# Patient Record
Sex: FEMALE | Race: OTHER | HISPANIC OR LATINO | URBAN - METROPOLITAN AREA
[De-identification: names, ages, dates, MRNs, and addresses within clinical notes are randomized per-mention and may not be internally consistent; named-entity substitution may affect disease eponyms.]

---

## 2023-07-03 ENCOUNTER — INPATIENT (INPATIENT)
Facility: HOSPITAL | Age: 88
LOS: 2 days | Discharge: HOSPICE | DRG: 64 | End: 2023-07-06
Attending: STUDENT IN AN ORGANIZED HEALTH CARE EDUCATION/TRAINING PROGRAM | Admitting: GENERAL ACUTE CARE HOSPITAL
Payer: MEDICAID

## 2023-07-03 VITALS
WEIGHT: 125 LBS | HEIGHT: 62 IN | SYSTOLIC BLOOD PRESSURE: 149 MMHG | HEART RATE: 65 BPM | TEMPERATURE: 98 F | DIASTOLIC BLOOD PRESSURE: 85 MMHG | OXYGEN SATURATION: 97 % | RESPIRATION RATE: 18 BRPM

## 2023-07-03 DIAGNOSIS — Z98.890 OTHER SPECIFIED POSTPROCEDURAL STATES: Chronic | ICD-10-CM

## 2023-07-03 DIAGNOSIS — I61.9 NONTRAUMATIC INTRACEREBRAL HEMORRHAGE, UNSPECIFIED: ICD-10-CM

## 2023-07-03 LAB
ALBUMIN SERPL ELPH-MCNC: 3.9 G/DL — SIGNIFICANT CHANGE UP (ref 3.3–5.2)
ALP SERPL-CCNC: 133 U/L — HIGH (ref 40–120)
ALT FLD-CCNC: 24 U/L — SIGNIFICANT CHANGE UP
ANION GAP SERPL CALC-SCNC: 14 MMOL/L — SIGNIFICANT CHANGE UP (ref 5–17)
APPEARANCE UR: CLEAR — SIGNIFICANT CHANGE UP
AST SERPL-CCNC: 25 U/L — SIGNIFICANT CHANGE UP
BACTERIA # UR AUTO: ABNORMAL
BASOPHILS # BLD AUTO: 0.08 K/UL — SIGNIFICANT CHANGE UP (ref 0–0.2)
BASOPHILS NFR BLD AUTO: 0.8 % — SIGNIFICANT CHANGE UP (ref 0–2)
BILIRUB SERPL-MCNC: 0.8 MG/DL — SIGNIFICANT CHANGE UP (ref 0.4–2)
BILIRUB UR-MCNC: NEGATIVE — SIGNIFICANT CHANGE UP
BUN SERPL-MCNC: 21.1 MG/DL — HIGH (ref 8–20)
CALCIUM SERPL-MCNC: 9.2 MG/DL — SIGNIFICANT CHANGE UP (ref 8.4–10.5)
CHLORIDE SERPL-SCNC: 99 MMOL/L — SIGNIFICANT CHANGE UP (ref 96–108)
CO2 SERPL-SCNC: 25 MMOL/L — SIGNIFICANT CHANGE UP (ref 22–29)
COLOR SPEC: YELLOW — SIGNIFICANT CHANGE UP
CREAT SERPL-MCNC: 1.15 MG/DL — SIGNIFICANT CHANGE UP (ref 0.5–1.3)
DIFF PNL FLD: NEGATIVE — SIGNIFICANT CHANGE UP
EGFR: 43 ML/MIN/1.73M2 — LOW
EOSINOPHIL # BLD AUTO: 0.02 K/UL — SIGNIFICANT CHANGE UP (ref 0–0.5)
EOSINOPHIL NFR BLD AUTO: 0.2 % — SIGNIFICANT CHANGE UP (ref 0–6)
EPI CELLS # UR: NEGATIVE — SIGNIFICANT CHANGE UP
GLUCOSE SERPL-MCNC: 111 MG/DL — HIGH (ref 70–99)
GLUCOSE UR QL: NEGATIVE MG/DL — SIGNIFICANT CHANGE UP
HCT VFR BLD CALC: 36.7 % — SIGNIFICANT CHANGE UP (ref 34.5–45)
HGB BLD-MCNC: 11.7 G/DL — SIGNIFICANT CHANGE UP (ref 11.5–15.5)
IMM GRANULOCYTES NFR BLD AUTO: 0.9 % — SIGNIFICANT CHANGE UP (ref 0–0.9)
KETONES UR-MCNC: NEGATIVE — SIGNIFICANT CHANGE UP
LACTATE BLDV-MCNC: 2.1 MMOL/L — HIGH (ref 0.5–2)
LEUKOCYTE ESTERASE UR-ACNC: NEGATIVE — SIGNIFICANT CHANGE UP
LIDOCAIN IGE QN: 37 U/L — SIGNIFICANT CHANGE UP (ref 22–51)
LYMPHOCYTES # BLD AUTO: 1.65 K/UL — SIGNIFICANT CHANGE UP (ref 1–3.3)
LYMPHOCYTES # BLD AUTO: 16.5 % — SIGNIFICANT CHANGE UP (ref 13–44)
MCHC RBC-ENTMCNC: 30.7 PG — SIGNIFICANT CHANGE UP (ref 27–34)
MCHC RBC-ENTMCNC: 31.9 GM/DL — LOW (ref 32–36)
MCV RBC AUTO: 96.3 FL — SIGNIFICANT CHANGE UP (ref 80–100)
MONOCYTES # BLD AUTO: 1.03 K/UL — HIGH (ref 0–0.9)
MONOCYTES NFR BLD AUTO: 10.3 % — SIGNIFICANT CHANGE UP (ref 2–14)
NEUTROPHILS # BLD AUTO: 7.12 K/UL — SIGNIFICANT CHANGE UP (ref 1.8–7.4)
NEUTROPHILS NFR BLD AUTO: 71.3 % — SIGNIFICANT CHANGE UP (ref 43–77)
NITRITE UR-MCNC: NEGATIVE — SIGNIFICANT CHANGE UP
PH UR: 8 — SIGNIFICANT CHANGE UP (ref 5–8)
PLATELET # BLD AUTO: 291 K/UL — SIGNIFICANT CHANGE UP (ref 150–400)
POTASSIUM SERPL-MCNC: 4.6 MMOL/L — SIGNIFICANT CHANGE UP (ref 3.5–5.3)
POTASSIUM SERPL-SCNC: 4.6 MMOL/L — SIGNIFICANT CHANGE UP (ref 3.5–5.3)
PROT SERPL-MCNC: 7.7 G/DL — SIGNIFICANT CHANGE UP (ref 6.6–8.7)
PROT UR-MCNC: 100 MG/DL
RBC # BLD: 3.81 M/UL — SIGNIFICANT CHANGE UP (ref 3.8–5.2)
RBC # FLD: 14.4 % — SIGNIFICANT CHANGE UP (ref 10.3–14.5)
RBC CASTS # UR COMP ASSIST: NEGATIVE /HPF — SIGNIFICANT CHANGE UP (ref 0–4)
SODIUM SERPL-SCNC: 138 MMOL/L — SIGNIFICANT CHANGE UP (ref 135–145)
SP GR SPEC: 1.01 — SIGNIFICANT CHANGE UP (ref 1.01–1.02)
UROBILINOGEN FLD QL: NEGATIVE MG/DL — SIGNIFICANT CHANGE UP
WBC # BLD: 9.99 K/UL — SIGNIFICANT CHANGE UP (ref 3.8–10.5)
WBC # FLD AUTO: 9.99 K/UL — SIGNIFICANT CHANGE UP (ref 3.8–10.5)
WBC UR QL: SIGNIFICANT CHANGE UP /HPF (ref 0–5)

## 2023-07-03 PROCEDURE — 70450 CT HEAD/BRAIN W/O DYE: CPT | Mod: 26,77

## 2023-07-03 PROCEDURE — 72125 CT NECK SPINE W/O DYE: CPT | Mod: 26,MA

## 2023-07-03 PROCEDURE — 70450 CT HEAD/BRAIN W/O DYE: CPT | Mod: 26,MA

## 2023-07-03 PROCEDURE — 73502 X-RAY EXAM HIP UNI 2-3 VIEWS: CPT | Mod: 26,RT

## 2023-07-03 PROCEDURE — 99285 EMERGENCY DEPT VISIT HI MDM: CPT

## 2023-07-03 PROCEDURE — 74177 CT ABD & PELVIS W/CONTRAST: CPT | Mod: 26,MA

## 2023-07-03 PROCEDURE — 99291 CRITICAL CARE FIRST HOUR: CPT

## 2023-07-03 RX ORDER — HYDRALAZINE HCL 50 MG
10 TABLET ORAL
Refills: 0 | Status: DISCONTINUED | OUTPATIENT
Start: 2023-07-03 | End: 2023-07-04

## 2023-07-03 RX ORDER — LABETALOL HCL 100 MG
10 TABLET ORAL
Refills: 0 | Status: DISCONTINUED | OUTPATIENT
Start: 2023-07-03 | End: 2023-07-04

## 2023-07-03 RX ORDER — ONDANSETRON 8 MG/1
4 TABLET, FILM COATED ORAL ONCE
Refills: 0 | Status: DISCONTINUED | OUTPATIENT
Start: 2023-07-03 | End: 2023-07-04

## 2023-07-03 RX ORDER — HYDRALAZINE HCL 50 MG
10 TABLET ORAL
Refills: 0 | Status: DISCONTINUED | OUTPATIENT
Start: 2023-07-03 | End: 2023-07-03

## 2023-07-03 RX ORDER — LABETALOL HCL 100 MG
10 TABLET ORAL
Refills: 0 | Status: DISCONTINUED | OUTPATIENT
Start: 2023-07-03 | End: 2023-07-03

## 2023-07-03 RX ORDER — LEVETIRACETAM 250 MG/1
250 TABLET, FILM COATED ORAL
Refills: 0 | Status: DISCONTINUED | OUTPATIENT
Start: 2023-07-03 | End: 2023-07-06

## 2023-07-03 RX ORDER — SODIUM CHLORIDE 9 MG/ML
1000 INJECTION INTRAMUSCULAR; INTRAVENOUS; SUBCUTANEOUS
Refills: 0 | Status: DISCONTINUED | OUTPATIENT
Start: 2023-07-03 | End: 2023-07-04

## 2023-07-03 RX ORDER — FENTANYL CITRATE 50 UG/ML
50 INJECTION INTRAVENOUS ONCE
Refills: 0 | Status: DISCONTINUED | OUTPATIENT
Start: 2023-07-03 | End: 2023-07-03

## 2023-07-03 RX ORDER — ACETAMINOPHEN 500 MG
1000 TABLET ORAL ONCE
Refills: 0 | Status: COMPLETED | OUTPATIENT
Start: 2023-07-03 | End: 2023-07-03

## 2023-07-03 RX ORDER — CHLORHEXIDINE GLUCONATE 213 G/1000ML
1 SOLUTION TOPICAL
Refills: 0 | Status: DISCONTINUED | OUTPATIENT
Start: 2023-07-03 | End: 2023-07-06

## 2023-07-03 RX ORDER — LEVETIRACETAM 250 MG/1
250 TABLET, FILM COATED ORAL ONCE
Refills: 0 | Status: DISCONTINUED | OUTPATIENT
Start: 2023-07-03 | End: 2023-07-03

## 2023-07-03 RX ORDER — SODIUM CHLORIDE 9 MG/ML
1000 INJECTION INTRAMUSCULAR; INTRAVENOUS; SUBCUTANEOUS ONCE
Refills: 0 | Status: COMPLETED | OUTPATIENT
Start: 2023-07-03 | End: 2023-07-03

## 2023-07-03 RX ORDER — LEVETIRACETAM 250 MG/1
500 TABLET, FILM COATED ORAL EVERY 12 HOURS
Refills: 0 | Status: DISCONTINUED | OUTPATIENT
Start: 2023-07-03 | End: 2023-07-03

## 2023-07-03 RX ORDER — CYCLOBENZAPRINE HYDROCHLORIDE 10 MG/1
0 TABLET, FILM COATED ORAL
Refills: 0
Start: 2023-07-03

## 2023-07-03 RX ADMIN — LEVETIRACETAM 400 MILLIGRAM(S): 250 TABLET, FILM COATED ORAL at 17:34

## 2023-07-03 RX ADMIN — Medication 10 MILLIGRAM(S): at 22:41

## 2023-07-03 RX ADMIN — Medication 1000 MILLIGRAM(S): at 16:24

## 2023-07-03 RX ADMIN — SODIUM CHLORIDE 1000 MILLILITER(S): 9 INJECTION INTRAMUSCULAR; INTRAVENOUS; SUBCUTANEOUS at 10:09

## 2023-07-03 RX ADMIN — Medication 400 MILLIGRAM(S): at 15:54

## 2023-07-03 RX ADMIN — SODIUM CHLORIDE 50 MILLILITER(S): 9 INJECTION INTRAMUSCULAR; INTRAVENOUS; SUBCUTANEOUS at 21:38

## 2023-07-03 RX ADMIN — Medication 10 MILLIGRAM(S): at 15:50

## 2023-07-03 RX ADMIN — Medication 10 MILLIGRAM(S): at 15:21

## 2023-07-03 NOTE — CONSULT NOTE ADULT - SUBJECTIVE AND OBJECTIVE BOX
HPI: Patient is a 89y old right-hand dominate Female who presents with a chief complaint of right-sided headache w relief from tylenol PRN & difficulty seeing on the left side /whole body pain over the last 2-3 days.  daughter, Alice Gonzalez, at bedside and adds that she and pt is visiting from [facility] and are planned to go back on 7/23/23, daughter states pt is at her baseline otherwise.  denied ASA/ AC/AP, home meds HCTZ/ Amlodipine.   - LOC   - trauma/ fall   denied Headache at this time, got fentanyl 50mcg in ED.  - Nausea / - Vomiting  denies new/ worsening weakness  denies new/ worsening paresthesias/ numbness   denies Bowel/ Bladder dysfunction, daughter states pt is able to make needs known and has not have any B/B dysfunction,      GCS: 15  Eye response (E)4  Verbal response (V)5  Motor response (M)6        PAST MEDICAL & SURGICAL HISTORY:  HTN (hypertension)      SOCIAL HISTORY: - EtOH, - tobacco, - drugs    FAMILY HISTORY: non-pertinent at this time        MEDICATIONS  (STANDING):  ondansetron Injectable 4 milliGRAM(s) IV Push once    MEDICATIONS  (PRN):  Allergies  No Known Allergies  Intolerances      Vital Signs Last 24 Hrs  T(C): 36.4 (03 Jul 2023 08:40), Max: 36.4 (03 Jul 2023 08:40)  T(F): 97.5 (03 Jul 2023 08:40), Max: 97.5 (03 Jul 2023 08:40)  HR: 65 (03 Jul 2023 08:40) (65 - 65)  BP: 149/85 (03 Jul 2023 08:40) (149/85 - 149/85)  BP(mean): --  RR: 18 (03 Jul 2023 08:40) (18 - 18)  SpO2: 97% (03 Jul 2023 08:40) (97% - 97%)    Parameters below as of 03 Jul 2023 08:40  Patient On (Oxygen Delivery Method): room air          PHYSICAL EXAM:  GENERAL: NAD, well-groomed, well-developed/ thin elderly female, AAOx self and daughter and somewhat cooperative.  HEAD: Atraumatic, Normocephalic, no palpable step-off appreciated on palpation, + abrasions to forehead   EYES: +L surgical pupil after cataract sx, b/l reactive pupils to light,   NECK: +tender to palpation all over   TS/LS: +tender to palpation all over   NERVOUS SYSTEM: Alert & Oriented X self, speech is clear and fluent in [place]/ ED  assisted, no dysarthria appreciated. Good concentration & partially cooperative; Motor Strength not cooperative for full motor testing, AG and withdrawing briskly to peripheral stims, sensory is at baseline and symmetric b/l; No pronators or ankle clonus appreciated b/l, cerebellar signs grossly intact b/l. FS/TML, no zacarias's b/l appreciated.   EXTREMITIES:  2+ Peripheral Pulses, No clubbing, cyanosis, or edema, +PVD w brown discoloration to b/l ankles   SKIN: No rashes or lesions appreciated, abrasion to forehead.                          11.7   9.99  )-----------( 291      ( 03 Jul 2023 09:59 )             36.7     07-03    138  |  99  |  21.1<H>  ----------------------------<  111<H>  4.6   |  25.0  |  1.15    Ca    9.2      03 Jul 2023 09:59    TPro  7.7  /  Alb  3.9  /  TBili  0.8  /  DBili  x   /  AST  25  /  ALT  24  /  AlkPhos  133<H>  07-03      Urinalysis Basic - ( 03 Jul 2023 09:59 )    Color: x / Appearance: x / SG: x / pH: x  Gluc: 111 mg/dL / Ketone: x  / Bili: x / Urobili: x   Blood: x / Protein: x / Nitrite: x   Leuk Esterase: x / RBC: x / WBC x   Sq Epi: x / Non Sq Epi: x / Bacteria: x      LIVER FUNCTIONS - ( 03 Jul 2023 09:59 )  Alb: 3.9 g/dL / Pro: 7.7 g/dL / ALK PHOS: 133 U/L / ALT: 24 U/L / AST: 25 U/L / GGT: x               RADIOLOGY & ADDITIONAL STUDIES:  < from: CT Head No Cont (07.03.23 @ 10:46) >  ACC: [id number] EXAM:  CT CERVICAL SPINE   ORDERED BY: SEAN HUGHES   ACC: [id number] EXAM:  CT BRAIN   ORDERED BY: SEAN HUGHES     PROCEDURE DATE:  07/03/2023    INTERPRETATION:  CLINICAL INDICATION: Trauma.  Technique: Noncontrast axial CT of the head and cervical spine was performed. Coronal and sagittal reformats were obtained.      COMPARISON: None.    FINDINGS:  Head CT:  There is an acute intraparenchymal hematoma centered in the right posterior parietal and occipital lobes measuring approximately 3.7 cm AP by 3.3 cm TR by 5.2 cm cc. There is a large degree of surrounding vasogenic edema. There is secondary mass effect upon the surrounding parenchyma and atrium of the right ventricle. No midline shift. There is periventricular hypoattenuation, suggestive of chronic microvascular ischemic changes.  The calvarium is intact. The visualized intraorbital compartment, paranasal sinuses and tympanomastoid cavities appear free of acute disease. Tiny hematoma the anterior scalp.    Cervical spine CT:  Grade 1 anterolisthesis of C3 on C4 and C6 on C7. Vertebral bodies are normal in height, without evidence of fracture or dislocation.   Prevertebral soft tissues are within normal limits without soft tissue swelling or hematoma.  There is multilevel intervertebral disc height loss. Multilevel endplate spondylytic changes are present. Multilevel disc bulging results in multilevel neural foraminal narrowing. No high-grade spinal canal stenosisis identified. The visualized lung apices are within normal limits.      IMPRESSION:   CT HEAD: Acute intraparenchymal hematoma centered in the right posterior parietal and occipital lobes measuring approximately 3.7 cm AP by 3.3 cm TR by 5.2 cm cc. There is a large degree of surrounding vasogenic edema.   There is secondary mass effect upon the surrounding parenchyma and atrium of the right ventricle. No midline shift.   Chronic changes as above.    CT CERVICAL SPINE: No acute abnormality. Chronic changes as above.  Critical value:  I discussed the finding of this report with Dr. Hughes at 11:08 AM on 7/3/2023.  Critical value policy of the hospital was followed.  Read back and confirmation of receipt of this communication was performed.  This verbal communication supplements the text report of this document.  --- End of Report ---    RASHMI DAVIES MD; Attending Radiologist  This document has been electronically signed. Jul  3 2023 11:14AM  < end of copied text >      < from: CT Abdomen and Pelvis w/ IV Cont (07.03.23 @ 10:54) >  ACC: [id number] EXAM:  CT ABDOMEN AND PELVIS IC   ORDERED BY: DIMPLE SILVERIO     PROCEDURE DATE:  07/03/2023    INTERPRETATION:  CLINICAL INFORMATION: Abdominal pain, weakness. Decreased p.o. intake.    COMPARISON: None.    CONTRAST/COMPLICATIONS:  IV Contrast: Omnipaque 350  80 cc administered   20 cc discarded  Oral Contrast: NONE  Complications: None reported at time of study completion    PROCEDURE:  CT of the Abdomen and Pelvis was performed.  Sagittal and coronal reformats were performed.    FINDINGS:  LOWER CHEST: Within normal limits.    LIVER: Within normal limits.  BILE DUCTS: Normal caliber.  GALLBLADDER: Faceted gallstones in the gallbladder.  SPLEEN: Within normal limits.  PANCREAS: Within normal limits.  ADRENALS: Within normal limits.  KIDNEYS/URETERS: Mild bilateral renal cortical irregularity, compatible with scarring. Small bilateral renal cysts. Otherwise, within normal limits.    BLADDER: Within normal limits.  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 The uterus and right adnexa are unremarkable by CT criteria.  BOWEL: The collapsed stomach is unremarkable. Large diverticulum of the third portion of the duodenum. Sigmoid diverticulosis. No bowel   obstruction. Appendix there is normal.  PERITONEUM: No ascites.  VESSELS: Moderate atherosclerotic disease of the nonaneurysmal abdominal aorta and iliac arteries.  RETROPERITONEUM/LYMPH NODES: No lymphadenopathy.  ABDOMINAL WALL: Within normal limits.  BONES: ORIF right femoral neck fracture. Healed left inferior pubic ramus fracture.    IMPRESSION:  1. 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  2. No evidence of acute inflammatory or obstructive process in the abdomen and pelvis.  --- End of Report ---    DARRICK COFFMAN MD; Attending Radiologist  This document has been electronically signed. Jul  3 2023 11:02AM  < end of copied text >        I spent a total time of 45 mins with the patient at bedside of which more than 50% of time was spent on counseling/coordination of care (Describe the nature of the counseling/coordination of care topics) HPI: Patient is a 89y old right-hand dominate Female who presents with a chief complaint of right-sided headache w relief from tylenol PRN & difficulty seeing on the left side /whole body pain over the last 2-3 days.  daughter, Alice Gonzalez, at bedside and adds that she and pt is visiting from [facility] and are planned to go back on 7/23/23, daughter states pt is at her baseline otherwise.  denied ASA/ AC/AP, home meds HCTZ/ Amlodipine.   - LOC   - trauma/ fall   denied Headache at this time, got fentanyl 50mcg in ED.  - Nausea / - Vomiting  denies new/ worsening weakness  denies new/ worsening paresthesias/ numbness   denies Bowel/ Bladder dysfunction, daughter states pt is able to make needs known and has not have any B/B dysfunction,        PAST MEDICAL & SURGICAL HISTORY:  HTN (hypertension)      SOCIAL HISTORY: - EtOH, - tobacco, - drugs    FAMILY HISTORY: non-pertinent at this time        MEDICATIONS  (STANDING):  ondansetron Injectable 4 milliGRAM(s) IV Push once    MEDICATIONS  (PRN):  Allergies  No Known Allergies  Intolerances      Vital Signs Last 24 Hrs  T(C): 36.4 (03 Jul 2023 08:40), Max: 36.4 (03 Jul 2023 08:40)  T(F): 97.5 (03 Jul 2023 08:40), Max: 97.5 (03 Jul 2023 08:40)  HR: 65 (03 Jul 2023 08:40) (65 - 65)  BP: 149/85 (03 Jul 2023 08:40) (149/85 - 149/85)  BP(mean): --  RR: 18 (03 Jul 2023 08:40) (18 - 18)  SpO2: 97% (03 Jul 2023 08:40) (97% - 97%)    Parameters below as of 03 Jul 2023 08:40  Patient On (Oxygen Delivery Method): room air          PHYSICAL EXAM:  GENERAL: NAD, well-groomed, well-developed/ thin elderly female, AAOx self and daughter and somewhat cooperative.  HEAD: Atraumatic, Normocephalic, no palpable step-off appreciated on palpation, + abrasions to forehead   EYES: +L surgical pupil after cataract sx, b/l reactive pupils to light,   NECK: +tender to palpation all over   TS/LS: +tender to palpation all over   NERVOUS SYSTEM: Alert & Oriented X self, speech is clear and fluent in Greek/ ED  assisted, no dysarthria appreciated. Good concentration & partially cooperative; + left field cut, OE spontaneously and to command. Motor Strength not cooperative for full motor testing, AG and withdrawing briskly to peripheral stims, sensory is at baseline and symmetric b/l; No pronators or ankle clonus appreciated b/l, cerebellar signs grossly intact b/l. FS/TML, no zacarias's b/l appreciated.   EXTREMITIES:  2+ Peripheral Pulses, No clubbing, cyanosis, or edema, +PVD w brown discoloration to b/l ankles   SKIN: No rashes or lesions appreciated, abrasion to forehead.                          11.7   9.99  )-----------( 291      ( 03 Jul 2023 09:59 )             36.7     07-03    138  |  99  |  21.1<H>  ----------------------------<  111<H>  4.6   |  25.0  |  1.15    Ca    9.2      03 Jul 2023 09:59    TPro  7.7  /  Alb  3.9  /  TBili  0.8  /  DBili  x   /  AST  25  /  ALT  24  /  AlkPhos  133<H>  07-03      Urinalysis Basic - ( 03 Jul 2023 09:59 )    Color: x / Appearance: x / SG: x / pH: x  Gluc: 111 mg/dL / Ketone: x  / Bili: x / Urobili: x   Blood: x / Protein: x / Nitrite: x   Leuk Esterase: x / RBC: x / WBC x   Sq Epi: x / Non Sq Epi: x / Bacteria: x      LIVER FUNCTIONS - ( 03 Jul 2023 09:59 )  Alb: 3.9 g/dL / Pro: 7.7 g/dL / ALK PHOS: 133 U/L / ALT: 24 U/L / AST: 25 U/L / GGT: x               RADIOLOGY & ADDITIONAL STUDIES:  < from: CT Head No Cont (07.03.23 @ 10:46) >  ACC: [id number] EXAM:  CT CERVICAL SPINE   ORDERED BY: SEAN HUGHES   ACC: [id number] EXAM:  CT BRAIN   ORDERED BY: SEAN HUGHES     PROCEDURE DATE:  07/03/2023    INTERPRETATION:  CLINICAL INDICATION: Trauma.  Technique: Noncontrast axial CT of the head and cervical spine was performed. Coronal and sagittal reformats were obtained.      COMPARISON: None.    FINDINGS:  Head CT:  There is an acute intraparenchymal hematoma centered in the right posterior parietal and occipital lobes measuring approximately 3.7 cm AP by 3.3 cm TR by 5.2 cm cc. There is a large degree of surrounding vasogenic edema. There is secondary mass effect upon the surrounding parenchyma and atrium of the right ventricle. No midline shift. There is periventricular hypoattenuation, suggestive of chronic microvascular ischemic changes.  The calvarium is intact. The visualized intraorbital compartment, paranasal sinuses and tympanomastoid cavities appear free of acute disease. Tiny hematoma the anterior scalp.    Cervical spine CT:  Grade 1 anterolisthesis of C3 on C4 and C6 on C7. Vertebral bodies are normal in height, without evidence of fracture or dislocation.   Prevertebral soft tissues are within normal limits without soft tissue swelling or hematoma.  There is multilevel intervertebral disc height loss. Multilevel endplate spondylytic changes are present. Multilevel disc bulging results in multilevel neural foraminal narrowing. No high-grade spinal canal stenosisis identified. The visualized lung apices are within normal limits.      IMPRESSION:   CT HEAD: Acute intraparenchymal hematoma centered in the right posterior parietal and occipital lobes measuring approximately 3.7 cm AP by 3.3 cm TR by 5.2 cm cc. There is a large degree of surrounding vasogenic edema.   There is secondary mass effect upon the surrounding parenchyma and atrium of the right ventricle. No midline shift.   Chronic changes as above.    CT CERVICAL SPINE: No acute abnormality. Chronic changes as above.  Critical value:  I discussed the finding of this report with Dr. Hughes at 11:08 AM on 7/3/2023.  Critical value policy of the hospital was followed.  Read back and confirmation of receipt of this communication was performed.  This verbal communication supplements the text report of this document.  --- End of Report ---    RASHMI DAVIES MD; Attending Radiologist  This document has been electronically signed. Jul  3 2023 11:14AM  < end of copied text >      < from: CT Abdomen and Pelvis w/ IV Cont (07.03.23 @ 10:54) >  ACC: [id number] EXAM:  CT ABDOMEN AND PELVIS IC   ORDERED BY: DIMPLE SILVERIO     PROCEDURE DATE:  07/03/2023    INTERPRETATION:  CLINICAL INFORMATION: Abdominal pain, weakness. Decreased p.o. intake.    COMPARISON: None.    CONTRAST/COMPLICATIONS:  IV Contrast: Omnipaque 350  80 cc administered   20 cc discarded  Oral Contrast: NONE  Complications: None reported at time of study completion    PROCEDURE:  CT of the Abdomen and Pelvis was performed.  Sagittal and coronal reformats were performed.    FINDINGS:  LOWER CHEST: Within normal limits.    LIVER: Within normal limits.  BILE DUCTS: Normal caliber.  GALLBLADDER: Faceted gallstones in the gallbladder.  SPLEEN: Within normal limits.  PANCREAS: Within normal limits.  ADRENALS: Within normal limits.  KIDNEYS/URETERS: Mild bilateral renal cortical irregularity, compatible with scarring. Small bilateral renal cysts. Otherwise, within normal limits.    BLADDER: Within normal limits.  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 The uterus and right adnexa are unremarkable by CT criteria.  BOWEL: The collapsed stomach is unremarkable. Large diverticulum of the third portion of the duodenum. Sigmoid diverticulosis. No bowel   obstruction. Appendix there is normal.  PERITONEUM: No ascites.  VESSELS: Moderate atherosclerotic disease of the nonaneurysmal abdominal aorta and iliac arteries.  RETROPERITONEUM/LYMPH NODES: No lymphadenopathy.  ABDOMINAL WALL: Within normal limits.  BONES: ORIF right femoral neck fracture. Healed left inferior pubic ramus fracture.    IMPRESSION:  1. 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  2. No evidence of acute inflammatory or obstructive process in the abdomen and pelvis.  --- End of Report ---    DARRICK COFFMAN MD; Attending Radiologist  This document has been electronically signed. Jul  3 2023 11:02AM  < end of copied text >        I spent a total time of 45 mins with the patient at bedside of which more than 50% of time was spent on counseling/coordination of care (Describe the nature of the counseling/coordination of care topics)

## 2023-07-03 NOTE — ED ADULT NURSE NOTE - NSFALLASSISTNEEDED_ED_ALL_ED
Pended Dexa Scan order    To Dr Nidia SUBRAMANIAN - pt declined dexa scan     Called pt notified of Dr's review  She has a lot of going on and does not want to schedule this now. She believes they have called some time ago for this and she had declined at the time.    She is aware osteoporosis places a person at risk for fx, but she states her fall was a very severe fall     She will keep this mind for future - she will call when she feels she is ready to have this done. Does not want ordered now    Pended order removed   Standing/Walking/Toileting/Moving from bed to stretcher

## 2023-07-03 NOTE — H&P ADULT - NSHPSOCIALHISTORY_GEN_ALL_CORE
lives with daughter Natalie, has two other sons (one in [place], other in [place]). Ambulates with walker at baseline.

## 2023-07-03 NOTE — DISCHARGE NOTE NURSING/CASE MANAGEMENT/SOCIAL WORK - PATIENT PORTAL LINK FT
You can access the FollowMyHealth Patient Portal offered by [facility] by registering at the following website: [email or web address]. By joining [employer]’s FollowMyHealth portal, you will also be able to view your health information using other applications (apps) compatible with our system.

## 2023-07-03 NOTE — H&P ADULT - NSHPLABSRESULTS_GEN_ALL_CORE
LABS:                        11.7   9.99  )-----------( 291      ( 2023 09:59 )             36.7     07    138  |  99  |  21.1<H>  ----------------------------<  111<H>  4.6   |  25.0  |  1.15    Ca    9.2      2023 09:59    TPro  7.7  /  Alb  3.9  /  TBili  0.8  /  DBili  x   /  AST  25  /  ALT  24  /  AlkPhos  133<H>  07      Urinalysis Basic - ( 2023 13:01 )  Color: Yellow / Appearance: Clear / S.010 / pH: x  Gluc: x / Ketone: Negative  / Bili: Negative / Urobili: Negative mg/dL   Blood: x / Protein: 100 mg/dL / Nitrite: Negative   Leuk Esterase: Negative / RBC: Negative /HPF / WBC 3-5 /HPF   Sq Epi: x / Non Sq Epi: x / Bacteria: Occasional      CAPILLARY BLOOD GLUCOSE  POCT Blood Glucose.: 114 mg/dL (2023 08:44)      RADIOLOGY & ADDITIONAL TESTS:  < from: CT Abdomen and Pelvis w/ IV Cont (23 @ 10:54) >  IMPRESSION:  1. 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  2. No evidence of acute inflammatory or obstructive process in the   abdomen and pelvis.    < from: CT Cervical Spine No Cont (23 @ 10:47) >  Cervical spine CT:  Grade 1 anterolisthesis of C3 on C4 and C6 on C7.   There is multilevel intervertebral disc height loss. Multilevel endplate   spondylytic changes are present. Multilevel disc bulging results in   multilevel neural foraminal narrowing.     < from: CT Head No Cont (23 @ 10:46) >  CT HEAD: Acute intraparenchymal hematoma centered in the right posterior   parietal and occipital lobes measuring approximately 3.7 cm AP by 3.3 cm   TR by 5.2 cm cc. There is a large degree of surrounding vasogenic edema.   There is secondary mass effect upon the surrounding parenchyma and atrium   of the right ventricle. No midline shift.   Chronic changes as above.

## 2023-07-03 NOTE — ED PROVIDER NOTE - OBJECTIVE STATEMENT
89 y/o F pt w/ PMHx of HTN presents to ED c/o generalized weakness and abd pain x3 days. Pt was fine last friday; on saturday she started getting weak and complaining of generalized body aches. Yesterday pt was unable to get out of bed 2/2 weakness and pain. She has been urinating and having BMs. Family reports decreased PO intake. Family denies cough, fever, chills, N/V/D, hematuria, foul smelling urine, or any other complaints.

## 2023-07-03 NOTE — ED PROVIDER NOTE - MDM PATIENT STATEMENT FOR ADDL TREATMENT
*Follow up with [facility] as needed.   *Return to the ED for fever, abdominal pain, or any other new/concerning symptoms.   
Patient with one or more new problems requiring additional work-up/treatment.

## 2023-07-03 NOTE — ED PROVIDER NOTE - CLINICAL SUMMARY MEDICAL DECISION MAKING FREE TEXT BOX
Patient with past medical history of hypertension presents to ED with generalized weakness and body aches.   abdomen is tender on exam.  Labs, CT abdomen pelvis, IVF,  pain control and antiemetics ordered. Patient with past medical history of hypertension presents to ED with generalized weakness and body aches.   abdomen is tender on exam.  Labs, CT abdomen pelvis, IVF,  pain control and antiemetics ordered. CT showing hematoma. NSG eval and rec neuro ICU admission. Reviewed all results with pt as well as plans for admission. Pt is comfortable with plan for admission. Questions answered.

## 2023-07-03 NOTE — ED ADULT TRIAGE NOTE - CHIEF COMPLAINT QUOTE
pt c/o increased lethargy. as per son at bedside. Friday she was normal, Saturday patient was unable to walk, but still eating, Sunday patient started c/o side pain and not able to answer questions today. when asked with  patient stated that everything is hurting and tearful but not answering any other questions.

## 2023-07-03 NOTE — ED ADULT NURSE REASSESSMENT NOTE - NURSING NEURO ORIENTATION
disoriented to person/disoriented to place/disoriented to time/situation

## 2023-07-03 NOTE — CONSULT NOTE ADULT - NS ATTEND AMEND GEN_ALL_CORE FT
NSGY Attg:    see above    patient seen and examined 7/5 am    imaging reviewed    agree with above NSGY Attg:    see above    patient seen and examined 7/4 am    imaging reviewed    agree with above

## 2023-07-03 NOTE — H&P ADULT - NSHPPHYSICALEXAM_GEN_ALL_CORE
Constitutional: 89 y/o female awake, lethargic, in no acute distress.  Eyes:  Sclera anicteric, conjunctiva noninjected.  ENMT: Oropharyngeal mucosa moist, pink. Tongue midline.    Neck: Neck supple, FROM.    Respiratory: normal chest rise, nonlabored breathing  Cardiovascular: Regular rate  Gastrointestinal:  Soft, nontender, nondistended.   Vascular: Extremities warm, no ulcers, no discoloration of skin.   Neurological: AAOx1 to name, verbalizes name, limited speech  intermittently opens eyes spontaneously, effort dependent exam   unable to follow commands, b/l UE 4/5 without drift, b/l LE 2/5  unable to assess sensation due to mental status  Skin: Warm, dry, no erythema. Constitutional: 89 y/o female awake, lethargic, in no acute distress.  Eyes:  R pupil 3mm brisk, L pupil surgical, Sclera anicteric, conjunctiva noninjected.  ENMT: Oropharyngeal mucosa moist, pink. Tongue midline.    Neck: Neck supple, FROM.    Respiratory: normal chest rise, nonlabored breathing  Cardiovascular: Regular rate  Gastrointestinal:  Soft, nontender, nondistended.   Vascular: Extremities warm, no ulcers, no discoloration of skin.   Neurological: AAOx1 to name, verbalizes name, limited speech  intermittently opens eyes spontaneously, effort dependent exam   unable to follow commands, b/l UE 4/5 without drift, b/l LE 2/5  unable to assess sensation due to mental status  Skin: Warm, dry, no erythema.

## 2023-07-03 NOTE — CONSULT NOTE ADULT - ASSESSMENT
89y old right-hand dominate Female who presents with a chief complaint of right-sided headache w relief from tylenol PRN & difficulty seeing on the left side /whole body pain over the last 2-3 days.  daughter, Alice Gonzalez, at bedside and adds that she and pt is visiting from [facility] and are planned to go back on 7/23/23, daughter states pt is at her baseline otherwise/ denied B/B dysfunction or shaking/ twitching.   daughter is leaning towards DNR/I, however, states she wants to further d/w family/ son.    CT HEAD: Acute intraparenchymal hematoma centered in the right posterior parietal and occipital lobes measuring approximately 3.7 cm AP by 3.3 cm TR by 5.2 cm cc. There is a large degree of surrounding vasogenic edema.   There is secondary mass effect upon the surrounding parenchyma and atrium of the right ventricle. No midline shift. Chronic changes as above.  CT CERVICAL SPINE: No acute abnormality. Chronic changes as above.  CT Abdomen and Pelvis w/ IV Cont: Benign-appearing fibrous lesion of the left ovary which can be further evaluated with dedicated ultrasound. No evidence of acute inflammatory or obstructive process in the abdomen and pelvis.    case d/w Dr. Oneill and images reviewed    89y old right-hand dominate Female who presents with a chief complaint of right-sided headache w relief from tylenol PRN & difficulty seeing on the left side /whole body pain over the last 2-3 days.  daughter, Alice Gonzalez, at bedside and adds that she and pt is visiting from [facility] and are planned to go back on 7/23/23, daughter states pt is at her baseline otherwise/ denied B/B dysfunction or shaking/ twitching.   daughter is leaning towards DNR/I, however, states she wants to further d/w family/ grandson, Jonnathan Hamm [phone]    CT HEAD: Acute intraparenchymal hematoma centered in the right posterior parietal and occipital lobes measuring approximately 3.7 cm AP by 3.3 cm TR by 5.2 cm cc. There is a large degree of surrounding vasogenic edema.   There is secondary mass effect upon the surrounding parenchyma and atrium of the right ventricle. No midline shift. Chronic changes as above.  CT CERVICAL SPINE: No acute abnormality. Chronic changes as above.  CT Abdomen and Pelvis w/ IV Cont: Benign-appearing fibrous lesion of the left ovary which can be further evaluated with dedicated ultrasound. No evidence of acute inflammatory or obstructive process in the abdomen and pelvis.    case d/w Dr. Oneill and images reviewed   no Nsx intervention indicated at this time  q1 hrs neuro checks/ ICU eval   repeat CTB w/o contrast 6 hrs  HOB > 30 degrees   hold all AC/AP/ ASA or chemical DVT PPx at this time    SBP < 150mmHg     if family wishes all to be done w aggressive work-up, can also obtain CTA head and neck w and MRI brain w and wo  if family proceeds w DNR/I, consider medical admit/ supportive care and q 4hr Neuro checks or as per primary team   further plan as per Attending  89y old right-hand dominate Female who presents with a chief complaint of right-sided headache w relief from tylenol PRN & difficulty seeing on the left side /whole body pain over the last 2-3 days.  daughter, Alice Gonzalez, at bedside and adds that she and pt is visiting from [facility] and are planned to go back on 7/23/23, daughter states pt is at her baseline otherwise/ denied B/B dysfunction or shaking/ twitching.   daughter is leaning towards DNR/I, however, states she wants to further d/w family/ grandson, Jonnathan Hamm [phone]    CT HEAD: Acute intraparenchymal hematoma centered in the right posterior parietal and occipital lobes measuring approximately 3.7 cm AP by 3.3 cm TR by 5.2 cm cc. There is a large degree of surrounding vasogenic edema.   There is secondary mass effect upon the surrounding parenchyma and atrium of the right ventricle. No midline shift. Chronic changes as above.  CT CERVICAL SPINE: No acute abnormality. Chronic changes as above.  CT Abdomen and Pelvis w/ IV Cont: Benign-appearing fibrous lesion of the left ovary which can be further evaluated with dedicated ultrasound. No evidence of acute inflammatory or obstructive process in the abdomen and pelvis.    case d/w Dr. Oneill and images reviewed   no Nsx intervention indicated at this time  q1 hrs neuro checks/ ICU eval   repeat CTB w/o contrast 6 hrs  HOB > 30 degrees   hold all AC/AP/ ASA or chemical DVT PPx at this time    SBP < 150mmHg   PRN analgesics/ avoid sedation  if family wishes all to be done w aggressive work-up, can also obtain CTA head and neck w and MRI brain w and wo  if family proceeds w DNR/I, consider medical admit/ supportive care and q 4hr Neuro checks or as per primary team   further plan as per Attending

## 2023-07-03 NOTE — ED ADULT NURSE REASSESSMENT NOTE - NS ED NURSE REASSESS COMMENT FT1
pt A&Ox0- not following any commands, resp even and unlabored no distress noted, cardiac monitor places and showing NSR, unable to assess PERRLA pt thrashes head around and clamps eyes shut with every attempt to assess, bed in lowest position and  side rails up

## 2023-07-03 NOTE — ED PROVIDER NOTE - ATTENDING CONTRIBUTION TO CARE
89yoF; with PMH signif for HTN; now p/w generalized weakness.  patient's family reports she has been c/o generalized malaise for 3 days, with bodyaches.  began c/o right flank pain and right sided headache.  decreased mental status over the past 24hours. denies n/v.  patient unable to get up from bed since yesterday.  Gen: confused, mumbling words  Head: NC, AT, PERRL, EOMI, normal lids/conjunctiva  Neck: +supple, no tenderness/meningismus/JVD, +Trachea midline  Pulm: Bilateral BS, normal resp effort, no wheeze/stridor/retractions  CV: RRR, no M/R/G, 2+dist pulses  Abd: soft, ND, +BS, no hepatosplenomegaly. ttp @ RLQ > LLQ, mildly distended  Mskel: ROM intact x4 extremities.  no edema/erythema/cyanosis  Neuro: awake, confused, sims x4  A/P:  89yoF p/w generalized weakness  -ct head, ct abd, labs, urinalysis, re-eval

## 2023-07-03 NOTE — ED ADULT NURSE NOTE - OBJECTIVE STATEMENT
Pt BIBA from a home, daughter states she has been weak since Saturday, yesterday started not speaking and staring into space, this morning pt confused and urinated on herself bc of inability to ambulate, hx of HLD and takes a "fluid pill", pt alert to painful stimuli, pt does not want to be touched and fights staff if being touched, as per family, pt AOx3 normally, pt smells of foul urine upon ED arrival, pt family denies recent falls, injury

## 2023-07-03 NOTE — DISCHARGE NOTE NURSING/CASE MANAGEMENT/SOCIAL WORK - NSDCPEFALRISK_GEN_ALL_CORE
For information on Fall & Injury Prevention, visit: [email or web address] OR  [email or web address] OR  https://www.cdc.gov/steadi/patient.html

## 2023-07-03 NOTE — PATIENT PROFILE ADULT - FALL HARM RISK - HARM RISK INTERVENTIONS
Assistance OOB with selected safe patient handling equipment/Communicate Risk of Fall with Harm to all staff/Discuss with provider need for PT consult/Monitor for mental status changes/Monitor gait and stability/Provide patient with walking aids - walker, cane, crutches/Reinforce activity limits and safety measures with patient and family/Tailored Fall Risk Interventions/Visual Cue: Yellow wristband and red socks/Bed in lowest position, wheels locked, appropriate side rails in place/Call bell, personal items and telephone in reach/Physically safe environment - no spills, clutter or unnecessary equipment/Purposeful Proactive Rounding/Room/bathroom lighting operational, light cord in reach/Unable to comprehend

## 2023-07-03 NOTE — ED ADULT NURSE NOTE - NSFALLHARMRISKINTERV_ED_ALL_ED
Assistance OOB with selected safe patient handling equipment if applicable/Assistance with ambulation/Communicate risk of Fall with Harm to all staff, patient, and family/Monitor gait and stability/Monitor for mental status changes and reorient to person, place, and time, as needed/Move patient closer to nursing station/within visual sight of ED staff/Provide visual cue: red socks, yellow wristband, yellow gown, etc/Reinforce activity limits and safety measures with patient and family/Toileting schedule using arm’s reach rule for commode and bathroom/Use of alarms - bed, stretcher, chair and/or video monitoring/Bed in lowest position, wheels locked, appropriate side rails in place/Call bell, personal items and telephone in reach/Instruct patient to call for assistance before getting out of bed/chair/stretcher/Non-slip footwear applied when patient is off stretcher/Kuna to call system/Physically safe environment - no spills, clutter or unnecessary equipment/Purposeful Proactive Rounding/Room/bathroom lighting operational, light cord in reach

## 2023-07-03 NOTE — DISCHARGE NOTE NURSING/CASE MANAGEMENT/SOCIAL WORK - NSDCPEPTSTROKESIGNS_GEN_ALL_CORE
[facility]
Sudden numbness or weakness of the face, arm, or leg, especially on one side of the body. Confusion, trouble speaking or understanding. Trouble seeing in one or both eyes. Trouble walking, dizziness, loss of balance or coordination. Severe headache.

## 2023-07-03 NOTE — H&P ADULT - HISTORY OF PRESENT ILLNESS
89 y/o Welsh speaking female from [facility] with PMHx of HTN presented to ED brought in by verónica for increased lethargy. As per grandoni, patient was in usual state of health without any acute complaints until Sunday 7/2 when patient became lethargic, not conversant as usual, and had an episode of urinary incontinence. Denies any episodes of incontinence in the past. 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 Neurosurgery consulted, recommended no acute surgical intervention at this time. Ambulates with walker at baseline. Grandson reportedly denies recent traumas, accidents, or falls.  ED  Abelino    89 y/o Georgian speaking female from [facility] with PMHx of HTN presented to ED brought in by verónica for increased lethargy. As per verónica, patient was in usual state of health without any acute complaints until Sunday 7/2 when patient became lethargic, not conversant as usual, and had an episode of urinary incontinence. Denies any episodes of incontinence in the past. 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 Neurosurgery consulted, recommended no acute surgical intervention at this time. Ambulates with walker at baseline. Grandson reportedly denies recent traumas, accidents, or falls.  ED  Abelino    89 y/o Macedonian speaking female from [facility] with PMHx of HTN, s/p R hip surgery, s/p b/l eye surgery presented to ED brought in by grandoni for increased lethargy. As per grandson, patient was in usual state of health without any acute complaints until Sunday 7/2 when patient became lethargic, not conversant as usual, and had an episode of urinary incontinence. Denies any episodes of incontinence in the past. 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 Neurosurgery consulted, recommended no acute surgical intervention at this time. Ambulates with walker at baseline. Patient currently c/o right hip pain. Grandson reportedly denies recent traumas, accidents, or falls.

## 2023-07-03 NOTE — ED ADULT NURSE REASSESSMENT NOTE - NS ED NURSE REASSESS COMMENT FT1
rcvd pt from RN KW, pt arouses to verbal stimuli, A&Ox0- not following any commands, resp even and unlabored no distress noted, cardiac monitor places and showing NSR, bed in lowest position, side rails up and pt repositioned for comfort rcvd pt from RN KW, pt arouses to verbal stimuli, A&Ox0- not following any commands, resp even and unlabored no distress noted, cardiac monitor places and showing NSR, unable to assess PERRLA pt thrashes head around and clamps eyes shut with every attempt to assess, bed in lowest position, side rails up and pt repositioned for comfort

## 2023-07-03 NOTE — H&P ADULT - ASSESSMENT
ASSESSMENT:   89 y/o Syriac speaking female from [facility] with PMHx of HTN presented to ED brought in by grandson for increased lethargy. As per grandson, patient was in usual state of health without any acute complaints until Sunday 7/2 when patient became lethargic, not conversant as usual, and had an episode of urinary incontinence. 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 Neurosurgery consulted, recommended no acute surgical intervention at this time. Patient made DNR/DNI, but family (daughter/son) would like to continue with further imaging for diagnosis.   ICH 2, MRS 1, NIHSS 13    PLAN:  Neuro:  - Q2 hour Neuro checks, Q1 hour Vitals  - HOB 30 degrees, Neck midline position  - Maintain normothermia, PO acetaminophen for temp>38 C or pain  - no acute neurosurgical intervention   - pending repeat CTH in 6hrs   - pend CTA head/neck   - pend MRI brain w+w/o   - seizure ppx: Keppra 500mg BID   - stroke core measures   - pain control PRN  - PT/OT   	  CV:  - SBP Goal 100-150  - PRN: hydralazine, labetalol  - pend echo    Pulm:  - Supplemental O2 PRN to maintain Spo2>92%    GI:  - NPO   - Bowel regimen when able to tolerate   - PRN: Zofran   	  Gu:  - Voiding  - Monitor Electrolytes & Renal Function    Heme/Onc:  - Monitor H&H  - Mechanical DVT Prophylaxis: Maintain B/L LE sequential compression devices  - pend CT chest for mets w/u   - pend LE duplex   	  ID:  - Monitor WBC and Temperature	    Endo  - Monitor BGL, maintain <180    D/w Dr. Blackman  ASSESSMENT:   89 y/o Georgian speaking female from [facility] with PMHx of HTN presented to ED brought in by grandson for increased lethargy. As per grandson, patient was in usual state of health without any acute complaints until Sunday 7/2 when patient became lethargic, not conversant as usual, and had an episode of urinary incontinence. 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 Neurosurgery consulted, recommended no acute surgical intervention at this time. Patient made DNR/DNI, but family (daughter/son) would like to continue with further imaging for diagnosis.   ICH 2, MRS 1, NIHSS 13    PLAN:  Neuro:  - Q2 hour Neuro checks, Q1 hour Vitals  - HOB 30 degrees, Neck midline position  - Maintain normothermia, PO acetaminophen for temp>38 C or pain  - no acute neurosurgical intervention   - pending repeat CTH in 6hrs   - pend CTA head/neck   - pend MRI brain w+w/o   - seizure ppx: Keppra 500mg BID   - stroke core measures   - pain control PRN  - PT/OT   	  CV:  - SBP Goal 100-150  - PRN: hydralazine, labetalol  - pend echo    Pulm:  - Supplemental O2 PRN to maintain Spo2>92%    GI:  - NPO   - Bowel regimen when able to tolerate   - PRN: Zofran   	  Gu:  - Voiding  - NS@50 while NPO   - Monitor Electrolytes & Renal Function    Heme/Onc:  - Monitor H&H  - Mechanical DVT Prophylaxis: Maintain B/L LE sequential compression devices  - pend CT chest for mets w/u   - pend LE duplex   	  ID:  - Monitor WBC and Temperature	    Endo  - Monitor BGL, maintain <180    D/w Dr. Blackman  ASSESSMENT:   89 y/o Korean speaking female from [facility] with PMHx of HTN presented to ED brought in by grandson for increased lethargy. As per grandson, patient was in usual state of health without any acute complaints until Sunday 7/2 when patient became lethargic, not conversant as usual, and had an episode of urinary incontinence. 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 Neurosurgery consulted, recommended no acute surgical intervention at this time. Patient made DNR/DNI, but family (daughter/son) would like to continue with further imaging for diagnosis.   ICH 2, MRS 1, NIHSS 13    PLAN:  Neuro:  - Q2 hour Neuro checks, Q1 hour Vitals  - HOB 30 degrees, Neck midline position  - Maintain normothermia, PO acetaminophen for temp>38 C or pain  - no acute neurosurgical intervention   - pending repeat CTH in 6hrs   - pend CTA head/neck   - pend MRI brain w+w/o   - seizure ppx: Keppra 250mg qd  - stroke core measures   - pain control PRN  - PT/OT   	  CV:  - SBP Goal 100-150  - PRN: hydralazine, labetalol  - pend echo    Pulm:  - Supplemental O2 PRN to maintain Spo2>92%    GI:  - NPO   - Bowel regimen when able to tolerate   - PRN: Zofran   	  Gu:  - Voiding  - NS@50 while NPO   - Monitor Electrolytes & Renal Function    Heme/Onc:  - Monitor H&H  - Mechanical DVT Prophylaxis: Maintain B/L LE sequential compression devices  - pend CT chest for mets w/u   - pend LE duplex   	  ID:  - Monitor WBC and Temperature	    Endo  - Monitor BGL, maintain <180    D/w Dr. Blackman

## 2023-07-03 NOTE — ED ADULT NURSE REASSESSMENT NOTE - NS ED NURSE REASSESS COMMENT FT1
pt A&Ox0- not following any commands, resp even and unlabored no distress noted, cardiac monitor places and showing NSR, unable to assess PERRLA pt thrashes head around and clamps eyes shut with every attempt to assess, bed in lowest position and  side rails up.

## 2023-07-03 NOTE — H&P ADULT - CRITICAL CARE ATTENDING COMMENT
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 Differential includes amyloid vs underlying mass vs heme conversion of the stroke.  Scores on admission: ICH 2, MRS 1, NIHSS 13, GCS 12.  PMHx of HTN, s/p R hip surgery, s/p b/l eye surgery.    Plan:  neurochecks, admit to NSICU  pending CTA, incl. chest w/contrast as w/up  maintain -150  Keppra for sz ppx - CrCL 24 + elderly age - use 250 once daily  continue IV hydration , keep NPO  rest as above      Pt is critically ill and at high risk of rapid deterioration/death due to abovementioned conditions.   Still requires critical care interventions - ongoing frequent evaluations, interventions and management adjustment by the Attending and ICU team, - and included review of relevant history, clinical examination, review of data and images, discussion of treatment with the multidisciplinary team and any consultants involved in this patient’s care as well as family discussion.

## 2023-07-04 LAB
ALBUMIN SERPL ELPH-MCNC: 3.3 G/DL — SIGNIFICANT CHANGE UP (ref 3.3–5.2)
ALP SERPL-CCNC: 113 U/L — SIGNIFICANT CHANGE UP (ref 40–120)
ALT FLD-CCNC: 17 U/L — SIGNIFICANT CHANGE UP
ANION GAP SERPL CALC-SCNC: 15 MMOL/L — SIGNIFICANT CHANGE UP (ref 5–17)
AST SERPL-CCNC: 20 U/L — SIGNIFICANT CHANGE UP
BILIRUB SERPL-MCNC: 0.8 MG/DL — SIGNIFICANT CHANGE UP (ref 0.4–2)
BUN SERPL-MCNC: 21.8 MG/DL — HIGH (ref 8–20)
CALCIUM SERPL-MCNC: 8.1 MG/DL — LOW (ref 8.4–10.5)
CHLORIDE SERPL-SCNC: 102 MMOL/L — SIGNIFICANT CHANGE UP (ref 96–108)
CO2 SERPL-SCNC: 20 MMOL/L — LOW (ref 22–29)
CREAT SERPL-MCNC: 0.97 MG/DL — SIGNIFICANT CHANGE UP (ref 0.5–1.3)
CULTURE RESULTS: SIGNIFICANT CHANGE UP
EGFR: 53 ML/MIN/1.73M2 — LOW
GLUCOSE SERPL-MCNC: 117 MG/DL — HIGH (ref 70–99)
HCT VFR BLD CALC: 32.4 % — LOW (ref 34.5–45)
HGB BLD-MCNC: 10.7 G/DL — LOW (ref 11.5–15.5)
MAGNESIUM SERPL-MCNC: 2.3 MG/DL — SIGNIFICANT CHANGE UP (ref 1.6–2.6)
MCHC RBC-ENTMCNC: 31.2 PG — SIGNIFICANT CHANGE UP (ref 27–34)
MCHC RBC-ENTMCNC: 33 GM/DL — SIGNIFICANT CHANGE UP (ref 32–36)
MCV RBC AUTO: 94.5 FL — SIGNIFICANT CHANGE UP (ref 80–100)
MRSA PCR RESULT.: SIGNIFICANT CHANGE UP
PHOSPHATE SERPL-MCNC: 4.3 MG/DL — SIGNIFICANT CHANGE UP (ref 2.4–4.7)
PLATELET # BLD AUTO: 287 K/UL — SIGNIFICANT CHANGE UP (ref 150–400)
POTASSIUM SERPL-MCNC: 3.9 MMOL/L — SIGNIFICANT CHANGE UP (ref 3.5–5.3)
POTASSIUM SERPL-SCNC: 3.9 MMOL/L — SIGNIFICANT CHANGE UP (ref 3.5–5.3)
PROT SERPL-MCNC: 6.9 G/DL — SIGNIFICANT CHANGE UP (ref 6.6–8.7)
RBC # BLD: 3.43 M/UL — LOW (ref 3.8–5.2)
RBC # FLD: 14.4 % — SIGNIFICANT CHANGE UP (ref 10.3–14.5)
S AUREUS DNA NOSE QL NAA+PROBE: DETECTED
SODIUM SERPL-SCNC: 137 MMOL/L — SIGNIFICANT CHANGE UP (ref 135–145)
SPECIMEN SOURCE: SIGNIFICANT CHANGE UP
WBC # BLD: 12.9 K/UL — HIGH (ref 3.8–10.5)
WBC # FLD AUTO: 12.9 K/UL — HIGH (ref 3.8–10.5)

## 2023-07-04 PROCEDURE — 99223 1ST HOSP IP/OBS HIGH 75: CPT

## 2023-07-04 PROCEDURE — 99233 SBSQ HOSP IP/OBS HIGH 50: CPT

## 2023-07-04 PROCEDURE — 99254 IP/OBS CNSLTJ NEW/EST MOD 60: CPT

## 2023-07-04 PROCEDURE — 71045 X-RAY EXAM CHEST 1 VIEW: CPT | Mod: 26

## 2023-07-04 PROCEDURE — 99497 ADVNCD CARE PLAN 30 MIN: CPT

## 2023-07-04 RX ORDER — MUPIROCIN 20 MG/G
1 OINTMENT TOPICAL EVERY 12 HOURS
Refills: 0 | Status: DISCONTINUED | OUTPATIENT
Start: 2023-07-04 | End: 2023-07-06

## 2023-07-04 RX ORDER — AMLODIPINE BESYLATE 2.5 MG/1
5 TABLET ORAL DAILY
Refills: 0 | Status: DISCONTINUED | OUTPATIENT
Start: 2023-07-04 | End: 2023-07-05

## 2023-07-04 RX ORDER — SENNA PLUS 8.6 MG/1
2 TABLET ORAL AT BEDTIME
Refills: 0 | Status: DISCONTINUED | OUTPATIENT
Start: 2023-07-04 | End: 2023-07-06

## 2023-07-04 RX ORDER — LABETALOL HCL 100 MG
5 TABLET ORAL EVERY 4 HOURS
Refills: 0 | Status: DISCONTINUED | OUTPATIENT
Start: 2023-07-04 | End: 2023-07-06

## 2023-07-04 RX ORDER — AMPICILLIN SODIUM AND SULBACTAM SODIUM 250; 125 MG/ML; MG/ML
3 INJECTION, POWDER, FOR SUSPENSION INTRAMUSCULAR; INTRAVENOUS ONCE
Refills: 0 | Status: COMPLETED | OUTPATIENT
Start: 2023-07-04 | End: 2023-07-04

## 2023-07-04 RX ORDER — HYDRALAZINE HCL 50 MG
5 TABLET ORAL EVERY 4 HOURS
Refills: 0 | Status: DISCONTINUED | OUTPATIENT
Start: 2023-07-04 | End: 2023-07-06

## 2023-07-04 RX ORDER — AMPICILLIN SODIUM AND SULBACTAM SODIUM 250; 125 MG/ML; MG/ML
3 INJECTION, POWDER, FOR SUSPENSION INTRAMUSCULAR; INTRAVENOUS EVERY 12 HOURS
Refills: 0 | Status: DISCONTINUED | OUTPATIENT
Start: 2023-07-05 | End: 2023-07-06

## 2023-07-04 RX ORDER — ACETAMINOPHEN 500 MG
1000 TABLET ORAL ONCE
Refills: 0 | Status: COMPLETED | OUTPATIENT
Start: 2023-07-04 | End: 2023-07-04

## 2023-07-04 RX ORDER — POTASSIUM CHLORIDE 20 MEQ
10 PACKET (EA) ORAL ONCE
Refills: 0 | Status: COMPLETED | OUTPATIENT
Start: 2023-07-04 | End: 2023-07-04

## 2023-07-04 RX ORDER — AMPICILLIN SODIUM AND SULBACTAM SODIUM 250; 125 MG/ML; MG/ML
INJECTION, POWDER, FOR SUSPENSION INTRAMUSCULAR; INTRAVENOUS
Refills: 0 | Status: DISCONTINUED | OUTPATIENT
Start: 2023-07-04 | End: 2023-07-04

## 2023-07-04 RX ORDER — ACETAMINOPHEN 500 MG
750 TABLET ORAL ONCE
Refills: 0 | Status: COMPLETED | OUTPATIENT
Start: 2023-07-04 | End: 2023-07-04

## 2023-07-04 RX ORDER — AMPICILLIN SODIUM AND SULBACTAM SODIUM 250; 125 MG/ML; MG/ML
INJECTION, POWDER, FOR SUSPENSION INTRAMUSCULAR; INTRAVENOUS
Refills: 0 | Status: DISCONTINUED | OUTPATIENT
Start: 2023-07-04 | End: 2023-07-06

## 2023-07-04 RX ORDER — HYDRALAZINE HCL 50 MG
5 TABLET ORAL
Refills: 0 | Status: DISCONTINUED | OUTPATIENT
Start: 2023-07-04 | End: 2023-07-04

## 2023-07-04 RX ADMIN — Medication 1 TABLET(S): at 11:21

## 2023-07-04 RX ADMIN — Medication 5 MILLIGRAM(S): at 03:21

## 2023-07-04 RX ADMIN — MUPIROCIN 1 APPLICATION(S): 20 OINTMENT TOPICAL at 17:35

## 2023-07-04 RX ADMIN — LEVETIRACETAM 400 MILLIGRAM(S): 250 TABLET, FILM COATED ORAL at 15:10

## 2023-07-04 RX ADMIN — Medication 300 MILLIGRAM(S): at 11:36

## 2023-07-04 RX ADMIN — Medication 5 MILLIGRAM(S): at 21:09

## 2023-07-04 RX ADMIN — Medication 400 MILLIGRAM(S): at 01:11

## 2023-07-04 RX ADMIN — CHLORHEXIDINE GLUCONATE 1 APPLICATION(S): 213 SOLUTION TOPICAL at 06:01

## 2023-07-04 RX ADMIN — Medication 5 MILLIGRAM(S): at 09:10

## 2023-07-04 RX ADMIN — Medication 1000 MILLIGRAM(S): at 01:41

## 2023-07-04 RX ADMIN — AMLODIPINE BESYLATE 5 MILLIGRAM(S): 2.5 TABLET ORAL at 11:21

## 2023-07-04 RX ADMIN — Medication 750 MILLIGRAM(S): at 12:30

## 2023-07-04 RX ADMIN — Medication 100 MILLIEQUIVALENT(S): at 06:01

## 2023-07-04 NOTE — PHYSICAL THERAPY INITIAL EVALUATION ADULT - ADDITIONAL COMMENTS
Per chart review: pt visiting from [facility], currently staying on a ground level apartment, [address] with family. Uses RW for ambulation and transfers

## 2023-07-04 NOTE — OCCUPATIONAL THERAPY INITIAL EVALUATION ADULT - PERTINENT HX OF CURRENT PROBLEM, REHAB EVAL
As per MD note: 89 y/o Korean speaking female from [facility] with PMHx of HTN, s/p R hip surgery, s/p b/l eye surgery presented to ED brought in by grandson for increased lethargy. As per grandson, patient was in usual state of health without any acute complaints until Sunday 7/2 when patient became lethargic, not conversant as usual, and had an episode of urinary incontinence. Denies any episodes of incontinence in the past. 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 Neurosurgery consulted, recommended no acute surgical intervention at this time. Ambulates with walker at baseline. Patient currently c/o right hip pain. Grandson reportedly denies recent traumas, accidents, or falls.

## 2023-07-04 NOTE — SWALLOW BEDSIDE ASSESSMENT ADULT - ORAL PHASE
impacted by cognition & behavioral overlay, up to 8 seconds/Decreased anterior-posterior movement of the bolus/Delayed oral transit time up to 10 seconds, impacted by cognition & behavioral overlay/Decreased anterior-posterior movement of the bolus/Delayed oral transit time

## 2023-07-04 NOTE — PHYSICAL THERAPY INITIAL EVALUATION ADULT - GENERAL OBSERVATIONS, REHAB EVAL
received in supine with daughter present, declining use of interpretive services, agreeable to participate. SCEDS+, tele+, IV+, primafit+

## 2023-07-04 NOTE — PROGRESS NOTE ADULT - SUBJECTIVE AND OBJECTIVE BOX
cc: ams / IPH      h&p / neuro icu  course: 89 y/o Polish speaking female from [facility], with PMHx of HTN, s/p R hip surgery couple of years ago , s/p b/l eye surgery presented to ED brought in by grandson for increased lethargy. As per grandson, patient was in usual state of health without any acute complaints until Sunday 7/2/23 when patient became lethargic, not conversant as usual, and had an episode of urinary incontinence. Denied any episodes of incontinence in the past. 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 Neurosurgery consulted, recommended no acute surgical intervention at this time.  Stroke neurology consulted. At time of presentation daughter at bedside stated that her mother did fall a little while ago back at home in [facility]. Stated that patient needed some assistance with showering and getting changed at home and used a cane to walk.        PAST MEDICAL & SURGICAL HISTORY:  HTN (hypertension)  Status post hip surgery  S/P eye surgery      interval hx: patient seen and eval. daughter at bedside. video  used. patient awake , [place] , doesnot follow much commands. moving upper ext , lower ext movement movement limited due to pain. no facial droop noted.   started on po feeds. tolerated well.   patient noted with low grade fever and mild leukocytosis.       Vital Signs Last 24 Hrs  T(C): 36.6 (04 Jul 2023 14:00), Max: 37.8 (03 Jul 2023 23:00)  T(F): 97.9 (04 Jul 2023 14:00), Max: 100 (03 Jul 2023 23:00)  HR: 71 (04 Jul 2023 16:00) (63 - 83)  BP: 146/58 (04 Jul 2023 16:00) (102/84 - 172/56)  BP(mean): 85 (04 Jul 2023 16:00) (65 - 100)  RR: 21 (04 Jul 2023 16:00) (15 - 25)  SpO2: 98% (04 Jul 2023 16:00) (96% - 100%)    Parameters below as of 04 Jul 2023 16:00  Patient On (Oxygen Delivery Method): room air    PHYSICAL EXAM:  General: NAD, pt is comfortably sitting up in bed, on room air  HEENT: EOMI b/l, face symmetric, tongue midline, neck FROM  Cardiovascular: Regular rate and rhythm  Respiratory: nonlabored breathing, normal chest rise  GI: abdomen soft, nontender, nondistended  Neuro: AAOx1 to name, verbalizes name, Polish speaking   intermittently opens eyes spontaneously, effort dependent exam   unable to follow commands, b/l UE 4+/5 without drift, b/l LE 2/5  unable to assess sensation due to mental status  Extremities: distal pulses 2+ x4     cc: ams / IPH      h&p / neuro icu  course: 89 y/o [place] speaking female from [facility], with PMHx of HTN, s/p R hip surgery couple of years ago , s/p b/l eye surgery presented to ED brought in by grandson for increased lethargy. As per grandson, patient was in usual state of health without any acute complaints until Sunday 7/2/23 when patient became lethargic, not conversant as usual, and had an episode of urinary incontinence. Denied any episodes of incontinence in the past. 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 Neurosurgery consulted, recommended no acute surgical intervention at this time.  Stroke neurology consulted. At time of presentation daughter at bedside stated that her mother did fall a little while ago back at home in [facility]. Stated that patient needed some assistance with showering and getting changed at home and used a cane to walk. down graded to medicine 7/4/23        PAST MEDICAL & SURGICAL HISTORY:  HTN (hypertension)  Status post hip surgery  S/P eye surgery      interval hx: patient seen and eval. daughter at bedside. video  used. patient awake , [place] , doesnot follow much commands. moving upper ext , lower ext movement movement limited due to pain. no facial droop noted.   started on po feeds. tolerated well.   patient noted with low grade fever and mild leukocytosis.       Vital Signs Last 24 Hrs  T(C): 36.6 (04 Jul 2023 14:00), Max: 37.8 (03 Jul 2023 23:00)  T(F): 97.9 (04 Jul 2023 14:00), Max: 100 (03 Jul 2023 23:00)  HR: 71 (04 Jul 2023 16:00) (63 - 83)  BP: 146/58 (04 Jul 2023 16:00) (102/84 - 172/56)  BP(mean): 85 (04 Jul 2023 16:00) (65 - 100)  RR: 21 (04 Jul 2023 16:00) (15 - 25)  SpO2: 98% (04 Jul 2023 16:00) (96% - 100%)    Parameters below as of 04 Jul 2023 16:00  Patient On (Oxygen Delivery Method): room air    PHYSICAL EXAM:  General: somnolent , responds to name , doesnot follow commands ,   HEENT: mm moist   Cardiovascular: Regular rate and rhythm, no m/r   Respiratory: clear b/l   GI: abdomen soft, nontender, nondistended  Neuro: AAOx1 to name, verbalizes name, [place] speaking intermittently opens eyes spontaneously, effort dependent exam unable to follow commands, b/l UE 4+/5 ,  b/l LE 2/5/ limited due to pain   Extremities: distal pulses 2+ x4, old healed rt hip sx scar noted       MEDICATIONS  (STANDING):  amLODIPine   Tablet 5 milliGRAM(s) Oral daily  chlorhexidine 2% Cloths 1 Application(s) Topical <User Schedule>  levETIRAcetam  IVPB 250 milliGRAM(s) IV Intermittent <User Schedule>  multivitamin 1 Tablet(s) Oral daily  mupirocin 2% Nasal 1 Application(s) Both Nostrils every 12 hours  senna 2 Tablet(s) Oral at bedtime    MEDICATIONS  (PRN):  hydrALAZINE Injectable 5 milliGRAM(s) IV Push every 4 hours PRN SBP>150  labetalol Injectable 5 milliGRAM(s) IV Push every 4 hours PRN SBP>150

## 2023-07-04 NOTE — SWALLOW BEDSIDE ASSESSMENT ADULT - SLP GENERAL OBSERVATIONS
Pt received & seen seated upright in bed, eyes closed, reduced cognition with behavioral overlay, daughter present, language line ID: [id number] used for Hebrew, 0/10 nonverbal pain scale pre/post

## 2023-07-04 NOTE — PHYSICAL THERAPY INITIAL EVALUATION ADULT - LEVEL OF INDEPENDENCE: SIT/STAND, REHAB EVAL
pt pushing aggressivley against attempts to stand, max a x 2 for attempt with no change, inability to achieve upright posture with or s RW and fam assist/dependent (less than 25% patients effort)

## 2023-07-04 NOTE — SWALLOW BEDSIDE ASSESSMENT ADULT - SWALLOW EVAL: DIAGNOSIS
Moderate oral dysphagia for assessed PO, impacted by cognition & behavioral overlay. Pharyngeal stage of swallow clinically unremarkable for assessed trials with NO overt s/s aspiration noted post intake. It should however, be noted that given pts cognition, pt does present at increased risk for aspiration

## 2023-07-04 NOTE — PROGRESS NOTE ADULT - ASSESSMENT
ASSESSMENT:   89 y/o [place] speaking female from [facility] with PMHx of HTN presented to ED brought in by grandson for increased lethargy. As per grandson, patient was in usual state of health without any acute complaints until Sunday 7/2 when patient became lethargic, not conversant as usual, and had an episode of urinary incontinence. 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 Neurosurgery consulted, recommended no acute surgical intervention at this time. Patient made DNR/DNI, but family (daughter/son) would like to continue with further imaging for diagnosis.   ICH 2, MRS 1, NIHSS 13    PLAN:  Neuro:  - Q2 hour Neuro checks, Protected Sleep ON, Q2 hour Vitals  - HOB 30 degrees, Neck midline position  - Maintain normothermia, PO acetaminophen for temp>38 C or pain  - No acute neurosurgical intervention   - Repeat CTH stable   - Pending CTA head/neck   - Pending  MRI brain w+w/o   - Seizure ppx: Keppra 250mg qd  - Stroke core measures   - Pain control PRN  - PT/OT   - SBP Goal 100-150  - PRN: hydralazine, labetalol  - pending TTE  - CT chest for mets w/u w benign appearing ovarian mass   - NPO   - Bowel regimen when able to tolerate   - PRN: Zofran   - Voiding  - NS@50 while NPO   - Mechanical DVT Prophylaxis: Maintain B/L LE sequential compression devices  - pending LED   - Further plans pending am rounds with attending  - Patient is DNR/DNI MOLST in chart

## 2023-07-04 NOTE — PHYSICAL THERAPY INITIAL EVALUATION ADULT - IMPAIRMENTS CONTRIBUTING IMPAIRED BED MOBILITY, REHAB EVAL
cognition/impaired coordination/impaired motor control/abnormal muscle tone/impaired postural control/decreased strength

## 2023-07-04 NOTE — SWALLOW BEDSIDE ASSESSMENT ADULT - MODE OF PRESENTATION
fed by daughter, pt with behavioral overlay: turned head away & hand over mouth when feeding attempted by ST fed by daughter/cup

## 2023-07-04 NOTE — CHART NOTE - NSCHARTNOTEFT_GEN_A_CORE
HPI:  ED  Abelino    89 y/o Yi speaking female from [facility] with PMHx of HTN, s/p R hip surgery, s/p b/l eye surgery presented to ED brought in by grandoni for increased lethargy. As per verónica, patient was in usual state of health without any acute complaints until Sunday 7/2 when patient became lethargic, not conversant as usual, and had an episode of urinary incontinence. Denies any episodes of incontinence in the past. 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 Neurosurgery consulted, recommended no acute surgical intervention at this time. Ambulates with walker at baseline. Patient currently c/o right hip pain. Grandson reportedly denies recent traumas, accidents, or falls.  (03 Jul 2023 14:56)    OVERNIGHT EVENTS:  Vital Signs Last 24 Hrs  T(C): 37.4 (04 Jul 2023 11:00), Max: 37.8 (03 Jul 2023 23:00)  T(F): 99.3 (04 Jul 2023 11:00), Max: 100 (03 Jul 2023 23:00)  HR: 82 (04 Jul 2023 11:00) (63 - 83)  BP: 143/61 (04 Jul 2023 11:00) (102/84 - 200/84)  BP(mean): 85 (04 Jul 2023 11:00) (65 - 100)  RR: 21 (04 Jul 2023 11:00) (15 - 25)  SpO2: 100% (04 Jul 2023 11:00) (96% - 100%)    Parameters below as of 04 Jul 2023 11:00  Patient On (Oxygen Delivery Method): room air        I&O's Summary    03 Jul 2023 07:01  -  04 Jul 2023 07:00  --------------------------------------------------------  IN: 550 mL / OUT: 650 mL / NET: -100 mL    04 Jul 2023 07:01  -  04 Jul 2023 13:39  --------------------------------------------------------  IN: 225 mL / OUT: 0 mL / NET: 225 mL        PHYSICAL EXAM:  Gen:  HEENT:  Neck:  Lungs:  Heart:  Abd:  Exts:  Neuro:  Wound/drains:    TUBES/LINES:  [] Delgadillo  [] Wound Drains  [] Others      DIET:  [] NPO  [] Mechanical  [] Tube feeds    LABS:                        10.7   12.90 )-----------( 287      ( 04 Jul 2023 01:56 )             32.4     07-04    137  |  102  |  21.8<H>  ----------------------------<  117<H>  3.9   |  20.0<L>  |  0.97    Ca    8.1<L>      04 Jul 2023 01:56  Phos  4.3     07-04  Mg     2.3     07-04    TPro  6.9  /  Alb  3.3  /  TBili  0.8  /  DBili  x   /  AST  20  /  ALT  17  /  AlkPhos  113  07-04      Urinalysis Basic - ( 04 Jul 2023 01:56 )    Color: x / Appearance: x / SG: x / pH: x  Gluc: 117 mg/dL / Ketone: x  / Bili: x / Urobili: x   Blood: x / Protein: x / Nitrite: x   Leuk Esterase: x / RBC: x / WBC x   Sq Epi: x / Non Sq Epi: x / Bacteria: x          CAPILLARY BLOOD GLUCOSE          Drug Levels: [] N/A    CSF Analysis: [] N/A      Allergies    No Known Allergies    Intolerances      MEDICATIONS:  Antibiotics:    Neuro:  levETIRAcetam  IVPB 250 milliGRAM(s) IV Intermittent <User Schedule>    Anticoagulation:    OTHER:  amLODIPine   Tablet 5 milliGRAM(s) Oral daily  chlorhexidine 2% Cloths 1 Application(s) Topical <User Schedule>  hydrALAZINE Injectable 5 milliGRAM(s) IV Push every 4 hours PRN  labetalol Injectable 5 milliGRAM(s) IV Push every 4 hours PRN  mupirocin 2% Nasal 1 Application(s) Both Nostrils every 12 hours    IVF:  multivitamin 1 Tablet(s) Oral daily    CULTURES:    RADIOLOGY & ADDITIONAL TESTS:      ASSESSMENT:  89y Female s/p    PLAN:  NEURO:    CARDIOVASCULAR:    PULMONARY:    RENAL:    GI:    HEME:    ID:    ENDO:    DVT PROPHYLAXIS:  [] Venodynes                                [] Heparin/Lovenox    DISPOSITION: HPI:  ED  Abelino  89 y/o Swedish speaking female from [facility] with PMHx of HTN, s/p R hip surgery, s/p b/l eye surgery presented to ED brought in by verónica for increased lethargy. As per grandson, patient was in usual state of health without any acute complaints until Sunday 7/2 when patient became lethargic, not conversant as usual, and had an episode of urinary incontinence. Denies any episodes of incontinence in the past. 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 Neurosurgery consulted, recommended no acute surgical intervention at this time. Ambulates with walker at baseline. Patient currently c/o right hip pain. Grandson reportedly denies recent traumas, accidents, or falls.  (03 Jul 2023 14:56)    OVERNIGHT EVENTS: Patient doing well, neuro exam stable. Swallow eval done, passed for pureed diet with thin liquids as tolerated. OT recc NASH.       Vital Signs Last 24 Hrs  T(C): 37.4 (04 Jul 2023 11:00), Max: 37.8 (03 Jul 2023 23:00)  T(F): 99.3 (04 Jul 2023 11:00), Max: 100 (03 Jul 2023 23:00)  HR: 82 (04 Jul 2023 11:00) (63 - 83)  BP: 143/61 (04 Jul 2023 11:00) (102/84 - 200/84)  BP(mean): 85 (04 Jul 2023 11:00) (65 - 100)  RR: 21 (04 Jul 2023 11:00) (15 - 25)  SpO2: 100% (04 Jul 2023 11:00) (96% - 100%)    Parameters below as of 04 Jul 2023 11:00  Patient On (Oxygen Delivery Method): room air        I&O's Summary    03 Jul 2023 07:01  -  04 Jul 2023 07:00  --------------------------------------------------------  IN: 550 mL / OUT: 650 mL / NET: -100 mL    04 Jul 2023 07:01  -  04 Jul 2023 13:39  --------------------------------------------------------  IN: 225 mL / OUT: 0 mL / NET: 225 mL        PHYSICAL EXAM:  Gen:  HEENT:  Neck:  Lungs:  Heart:  Abd:  Exts:  Neuro:  Wound/drains:    TUBES/LINES:  [] Delgadillo  [] Wound Drains  [] Others      DIET:  [] NPO  [] Mechanical  [] Tube feeds    LABS:                        10.7   12.90 )-----------( 287      ( 04 Jul 2023 01:56 )             32.4     07-04    137  |  102  |  21.8<H>  ----------------------------<  117<H>  3.9   |  20.0<L>  |  0.97    Ca    8.1<L>      04 Jul 2023 01:56  Phos  4.3     07-04  Mg     2.3     07-04    TPro  6.9  /  Alb  3.3  /  TBili  0.8  /  DBili  x   /  AST  20  /  ALT  17  /  AlkPhos  113  07-04      Urinalysis Basic - ( 04 Jul 2023 01:56 )    Color: x / Appearance: x / SG: x / pH: x  Gluc: 117 mg/dL / Ketone: x  / Bili: x / Urobili: x   Blood: x / Protein: x / Nitrite: x   Leuk Esterase: x / RBC: x / WBC x   Sq Epi: x / Non Sq Epi: x / Bacteria: x          CAPILLARY BLOOD GLUCOSE          Drug Levels: [] N/A    CSF Analysis: [] N/A      Allergies    No Known Allergies    Intolerances      MEDICATIONS:  Antibiotics:    Neuro:  levETIRAcetam  IVPB 250 milliGRAM(s) IV Intermittent <User Schedule>    Anticoagulation:    OTHER:  amLODIPine   Tablet 5 milliGRAM(s) Oral daily  chlorhexidine 2% Cloths 1 Application(s) Topical <User Schedule>  hydrALAZINE Injectable 5 milliGRAM(s) IV Push every 4 hours PRN  labetalol Injectable 5 milliGRAM(s) IV Push every 4 hours PRN  mupirocin 2% Nasal 1 Application(s) Both Nostrils every 12 hours    IVF:  multivitamin 1 Tablet(s) Oral daily    CULTURES:    RADIOLOGY & ADDITIONAL TESTS:      ASSESSMENT:  89y Female s/p    PLAN:  NEURO:    CARDIOVASCULAR:    PULMONARY:    RENAL:    GI:    HEME:    ID:    ENDO:    DVT PROPHYLAXIS:  [] Venodynes                                [] Heparin/Lovenox    DISPOSITION: HPI:  ED  Abelino  89 y/o Greenlandic speaking female from [facility] with PMHx of HTN, s/p R hip surgery, s/p b/l eye surgery presented to ED brought in by verónica for increased lethargy. As per grandson, patient was in usual state of health without any acute complaints until Sunday 7/2 when patient became lethargic, not conversant as usual, and had an episode of urinary incontinence. Denies any episodes of incontinence in the past. 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 Neurosurgery consulted, recommended no acute surgical intervention at this time. Ambulates with walker at baseline. Patient currently c/o right hip pain. Grandson reportedly denies recent traumas, accidents, or falls.  (03 Jul 2023 14:56)    OVERNIGHT EVENTS: Patient doing well, neuro exam stable. Swallow eval done, passed for pureed diet with thin liquids as tolerated. OT recc NASH.     Vital Signs Last 24 Hrs  T(C): 37.4 (04 Jul 2023 11:00), Max: 37.8 (03 Jul 2023 23:00)  T(F): 99.3 (04 Jul 2023 11:00), Max: 100 (03 Jul 2023 23:00)  HR: 82 (04 Jul 2023 11:00) (63 - 83)  BP: 143/61 (04 Jul 2023 11:00) (102/84 - 200/84)  BP(mean): 85 (04 Jul 2023 11:00) (65 - 100)  RR: 21 (04 Jul 2023 11:00) (15 - 25)  SpO2: 100% (04 Jul 2023 11:00) (96% - 100%)    Parameters below as of 04 Jul 2023 11:00  Patient On (Oxygen Delivery Method): room air      I&O's Summary    03 Jul 2023 07:01  -  04 Jul 2023 07:00  --------------------------------------------------------  IN: 550 mL / OUT: 650 mL / NET: -100 mL    04 Jul 2023 07:01  -  04 Jul 2023 13:39  --------------------------------------------------------  IN: 225 mL / OUT: 0 mL / NET: 225 mL        PHYSICAL EXAM:  General: NAD, pt is comfortably sitting up in bed, on room air  HEENT: EOMI b/l, face symmetric, tongue midline, neck FROM  Cardiovascular: Regular rate and rhythm  Respiratory: nonlabored breathing, normal chest rise  GI: abdomen soft, nontender, nondistended  Neuro: AAOx1 to name, verbalizes name, Green[place]ic speaking   intermittently opens eyes spontaneously, effort dependent exam   unable to follow commands, b/l UE 4+/5 without drift, b/l LE 2/5  unable to assess sensation due to mental status  Extremities: distal pulses 2+ x4      TUBES/LINES:  [] Delgadillo  [] Wound Drains  [] Others      DIET:  [] NPO  [x] Mechanical  [] Tube feeds    LABS:                        10.7   12.90 )-----------( 287      ( 04 Jul 2023 01:56 )             32.4     07-04    137  |  102  |  21.8<H>  ----------------------------<  117<H>  3.9   |  20.0<L>  |  0.97    Ca    8.1<L>      04 Jul 2023 01:56  Phos  4.3     07-04  Mg     2.3     07-04    TPro  6.9  /  Alb  3.3  /  TBili  0.8  /  DBili  x   /  AST  20  /  ALT  17  /  AlkPhos  113  07-04  Urinalysis Basic - ( 04 Jul 2023 01:56 )  Color: x / Appearance: x / SG: x / pH: x  Gluc: 117 mg/dL / Ketone: x  / Bili: x / Urobili: x   Blood: x / Protein: x / Nitrite: x   Leuk Esterase: x / RBC: x / WBC x   Sq Epi: x / Non Sq Epi: x / Bacteria: x    Allergies  No Known Allergies      MEDICATIONS:  levETIRAcetam  IVPB 250 milliGRAM(s) IV Intermittent <User Schedule>  amLODIPine   Tablet 5 milliGRAM(s) Oral daily  chlorhexidine 2% Cloths 1 Application(s) Topical <User Schedule>  hydrALAZINE Injectable 5 milliGRAM(s) IV Push every 4 hours PRN  labetalol Injectable 5 milliGRAM(s) IV Push every 4 hours PRN  mupirocin 2% Nasal 1 Application(s) Both Nostrils every 12 hours  multivitamin 1 Tablet(s) Oral daily    RADIOLOGY & ADDITIONAL TESTS:  < from: CT Head No Cont (07.03.23 @ 19:19) >  IMPRESSION:  No significant intervalchange in right parietal and parenchyma   hemorrhage with local subarachnoid extension of blood. No new site of   bleeding or progressive mass effect. No midline shift.  Advanced chronic microvascular ischemic changes.    < from: CT Abdomen and Pelvis w/ IV Cont (07.03.23 @ 10:54) >  IMPRESSION:  1. 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  2. No evidence of acute inflammatory or obstructive process in the   abdomen and pelvis.    < from: CT Cervical Spine No Cont (07.03.23 @ 10:47) >  CT CERVICAL SPINE: No acute abnormality. Chronic changes as above.    < from: CT Head No Cont (07.03.23 @ 10:46) >  IMPRESSION:  CT HEAD: Acute intraparenchymal hematoma centered in the right posterior   parietal and occipital lobes measuring approximately 3.7 cm AP by 3.3 cm   TR by 5.2 cm cc. There is a large degree of surrounding vasogenic edema.   There is secondary mass effect upon the surrounding parenchyma and atrium   of the right ventricle. No midline shift.   Chronic changes as above.      ASSESSMENT: HPI:  ED  Abelino  89 y/o Albanian speaking female from [facility] with PMHx of HTN, s/p R hip surgery, s/p b/l eye surgery presented to ED brought in by verónica for increased lethargy. As per grandson, patient was in usual state of health without any acute complaints until Sunday 7/2 when patient became lethargic, not conversant as usual, and had an episode of urinary incontinence. Denies any episodes of incontinence in the past. 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 Neurosurgery consulted, recommended no acute surgical intervention at this time. Ambulates with walker at baseline. Patient currently c/o right hip pain. Grandson reportedly denies recent traumas, accidents, or falls.  (03 Jul 2023 14:56)    OVERNIGHT EVENTS: Patient doing well, neuro exam stable. Swallow eval done, passed for pureed diet with thin liquids as tolerated. OT recc NASH. Stroke neurology consulted.     Vital Signs Last 24 Hrs  T(C): 37.4 (04 Jul 2023 11:00), Max: 37.8 (03 Jul 2023 23:00)  T(F): 99.3 (04 Jul 2023 11:00), Max: 100 (03 Jul 2023 23:00)  HR: 82 (04 Jul 2023 11:00) (63 - 83)  BP: 143/61 (04 Jul 2023 11:00) (102/84 - 200/84)  BP(mean): 85 (04 Jul 2023 11:00) (65 - 100)  RR: 21 (04 Jul 2023 11:00) (15 - 25)  SpO2: 100% (04 Jul 2023 11:00) (96% - 100%)    Parameters below as of 04 Jul 2023 11:00  Patient On (Oxygen Delivery Method): room air      I&O's Summary    03 Jul 2023 07:01  -  04 Jul 2023 07:00  --------------------------------------------------------  IN: 550 mL / OUT: 650 mL / NET: -100 mL    04 Jul 2023 07:01  -  04 Jul 2023 13:39  --------------------------------------------------------  IN: 225 mL / OUT: 0 mL / NET: 225 mL        PHYSICAL EXAM:  General: NAD, pt is comfortably sitting up in bed, on room air  HEENT: EOMI b/l, face symmetric, tongue midline, neck FROM  Cardiovascular: Regular rate and rhythm  Respiratory: nonlabored breathing, normal chest rise  GI: abdomen soft, nontender, nondistended  Neuro: AAOx1 to name, verbalizes name, Albanian speaking   intermittently opens eyes spontaneously, effort dependent exam   unable to follow commands, b/l UE 4+/5 without drift, b/l LE 2/5  unable to assess sensation due to mental status  Extremities: distal pulses 2+ x4      TUBES/LINES:  [] Delgadillo  [] Wound Drains  [] Others      DIET:  [] NPO  [x] Mechanical  [] Tube feeds    LABS:                        10.7   12.90 )-----------( 287      ( 04 Jul 2023 01:56 )             32.4     07-04    137  |  102  |  21.8<H>  ----------------------------<  117<H>  3.9   |  20.0<L>  |  0.97    Ca    8.1<L>      04 Jul 2023 01:56  Phos  4.3     07-04  Mg     2.3     07-04    TPro  6.9  /  Alb  3.3  /  TBili  0.8  /  DBili  x   /  AST  20  /  ALT  17  /  AlkPhos  113  07-04  Urinalysis Basic - ( 04 Jul 2023 01:56 )  Color: x / Appearance: x / SG: x / pH: x  Gluc: 117 mg/dL / Ketone: x  / Bili: x / Urobili: x   Blood: x / Protein: x / Nitrite: x   Leuk Esterase: x / RBC: x / WBC x   Sq Epi: x / Non Sq Epi: x / Bacteria: x    Allergies  No Known Allergies      MEDICATIONS:  levETIRAcetam  IVPB 250 milliGRAM(s) IV Intermittent <User Schedule>  amLODIPine   Tablet 5 milliGRAM(s) Oral daily  chlorhexidine 2% Cloths 1 Application(s) Topical <User Schedule>  hydrALAZINE Injectable 5 milliGRAM(s) IV Push every 4 hours PRN  labetalol Injectable 5 milliGRAM(s) IV Push every 4 hours PRN  mupirocin 2% Nasal 1 Application(s) Both Nostrils every 12 hours  multivitamin 1 Tablet(s) Oral daily    RADIOLOGY & ADDITIONAL TESTS:  < from: CT Head No Cont (07.03.23 @ 19:19) >  IMPRESSION:  No significant intervalchange in right parietal and parenchyma   hemorrhage with local subarachnoid extension of blood. No new site of   bleeding or progressive mass effect. No midline shift.  Advanced chronic microvascular ischemic changes.    < from: CT Abdomen and Pelvis w/ IV Cont (07.03.23 @ 10:54) >  IMPRESSION:  1. 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  2. No evidence of acute inflammatory or obstructive process in the   abdomen and pelvis.    < from: CT Cervical Spine No Cont (07.03.23 @ 10:47) >  CT CERVICAL SPINE: No acute abnormality. Chronic changes as above.    < from: CT Head No Cont (07.03.23 @ 10:46) >  IMPRESSION:  CT HEAD: Acute intraparenchymal hematoma centered in the right posterior   parietal and occipital lobes measuring approximately 3.7 cm AP by 3.3 cm   TR by 5.2 cm cc. There is a large degree of surrounding vasogenic edema.   There is secondary mass effect upon the surrounding parenchyma and atrium   of the right ventricle. No midline shift.   Chronic changes as above.      ASSESSMENT:  89 y/o Albanian speaking female from [facility] with PMHx of HTN presented to ED brought in by grandson for increased lethargy. As per grandson, patient was in usual state of health without any acute complaints until Sunday 7/2 when patient became lethargic, not conversant as usual, and had an episode of urinary incontinence. 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 Neurosurgery consulted, recommended no acute surgical intervention at this time. Patient made DNR/DNI, but family (daughter/son) would like to continue with further imaging for diagnosis.   ICH 2, MRS 1, NIHSS 13      PLAN:  Neuro:  - neuro checks q4hrs   - HOB 30 degrees, Neck midline position  - Maintain normothermia, PO acetaminophen for temp>38 C or pain  - no acute neurosurgical intervention   - pend CTA head/neck   - pend MRI brain w+w/o   - seizure ppx: Keppra 250mg qd  - stroke core measures   - pain control PRN  - OT recc NSAH   - stroke neurology consulted   	  CV:  - SBP Goal 100-150  - Amlodipine 5mg   - PRN: hydralazine, labetalol  - pend echo    Pulm:  - Supplemental O2 PRN to maintain Spo2>92%    GI:  - pureed diet with thin liquids   - Bowel regimen: Senna   - PRN: Zofran   	  Gu:  - Voiding, IVL   - Monitor Electrolytes & Renal Function    Heme/Onc:  - Monitor H&H  - Mechanical DVT Prophylaxis: Maintain B/L LE sequential compression devices  - pend CT chest for mets w/u   - pend LE duplex   	  ID:  - Monitor WBC and Temperature  - +MSSA, bactroban x5days (7/4-7/8)	    Endo  - Monitor BGL, maintain <180    D/w Dr. Blackman HPI:  ED  Abelino  89 y/o Singaporean speaking female from [facility] with PMHx of HTN, s/p R hip surgery, s/p b/l eye surgery presented to ED brought in by verónica for increased lethargy. As per grandson, patient was in usual state of health without any acute complaints until Sunday 7/2 when patient became lethargic, not conversant as usual, and had an episode of urinary incontinence. Denies any episodes of incontinence in the past. 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 Neurosurgery consulted, recommended no acute surgical intervention at this time. Ambulates with walker at baseline. Patient currently c/o right hip pain. Grandson reportedly denies recent traumas, accidents, or falls.  (03 Jul 2023 14:56)    OVERNIGHT EVENTS: Patient doing well, neuro exam stable. Swallow eval done, passed for pureed diet with thin liquids as tolerated. OT recc NASH. Stroke neurology consulted.     Vital Signs Last 24 Hrs  T(C): 37.4 (04 Jul 2023 11:00), Max: 37.8 (03 Jul 2023 23:00)  T(F): 99.3 (04 Jul 2023 11:00), Max: 100 (03 Jul 2023 23:00)  HR: 82 (04 Jul 2023 11:00) (63 - 83)  BP: 143/61 (04 Jul 2023 11:00) (102/84 - 200/84)  BP(mean): 85 (04 Jul 2023 11:00) (65 - 100)  RR: 21 (04 Jul 2023 11:00) (15 - 25)  SpO2: 100% (04 Jul 2023 11:00) (96% - 100%)    Parameters below as of 04 Jul 2023 11:00  Patient On (Oxygen Delivery Method): room air    I&O's Summary    03 Jul 2023 07:01  -  04 Jul 2023 07:00  --------------------------------------------------------  IN: 550 mL / OUT: 650 mL / NET: -100 mL    04 Jul 2023 07:01  -  04 Jul 2023 13:39  --------------------------------------------------------  IN: 225 mL / OUT: 0 mL / NET: 225 mL    PHYSICAL EXAM:  General: NAD, pt is comfortably sitting up in bed, on room air  HEENT: EOMI b/l, face symmetric, tongue midline, neck FROM  Cardiovascular: Regular rate and rhythm  Respiratory: nonlabored breathing, normal chest rise  GI: abdomen soft, nontender, nondistended  Neuro: AAOx1 to name, verbalizes name, Singaporean speaking   intermittently opens eyes spontaneously, effort dependent exam   unable to follow commands, b/l UE 4+/5 without drift, b/l LE 2/5  unable to assess sensation due to mental status  Extremities: distal pulses 2+ x4      TUBES/LINES:  [] Delgadillo  [] Wound Drains  [] Others      DIET:  [] NPO  [x] Mechanical  [] Tube feeds    LABS:                        10.7   12.90 )-----------( 287      ( 04 Jul 2023 01:56 )             32.4     07-04    137  |  102  |  21.8<H>  ----------------------------<  117<H>  3.9   |  20.0<L>  |  0.97    Ca    8.1<L>      04 Jul 2023 01:56  Phos  4.3     07-04  Mg     2.3     07-04    TPro  6.9  /  Alb  3.3  /  TBili  0.8  /  DBili  x   /  AST  20  /  ALT  17  /  AlkPhos  113  07-04  Urinalysis Basic - ( 04 Jul 2023 01:56 )  Color: x / Appearance: x / SG: x / pH: x  Gluc: 117 mg/dL / Ketone: x  / Bili: x / Urobili: x   Blood: x / Protein: x / Nitrite: x   Leuk Esterase: x / RBC: x / WBC x   Sq Epi: x / Non Sq Epi: x / Bacteria: x    Allergies  No Known Allergies      MEDICATIONS:  levETIRAcetam  IVPB 250 milliGRAM(s) IV Intermittent <User Schedule>  amLODIPine   Tablet 5 milliGRAM(s) Oral daily  chlorhexidine 2% Cloths 1 Application(s) Topical <User Schedule>  hydrALAZINE Injectable 5 milliGRAM(s) IV Push every 4 hours PRN  labetalol Injectable 5 milliGRAM(s) IV Push every 4 hours PRN  mupirocin 2% Nasal 1 Application(s) Both Nostrils every 12 hours  multivitamin 1 Tablet(s) Oral daily    RADIOLOGY & ADDITIONAL TESTS:  < from: CT Head No Cont (07.03.23 @ 19:19) >  IMPRESSION:  No significant intervalchange in right parietal and parenchyma   hemorrhage with local subarachnoid extension of blood. No new site of   bleeding or progressive mass effect. No midline shift.  Advanced chronic microvascular ischemic changes.    < from: CT Abdomen and Pelvis w/ IV Cont (07.03.23 @ 10:54) >  IMPRESSION:  1. 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  2. No evidence of acute inflammatory or obstructive process in the   abdomen and pelvis.    < from: CT Cervical Spine No Cont (07.03.23 @ 10:47) >  CT CERVICAL SPINE: No acute abnormality. Chronic changes as above.    < from: CT Head No Cont (07.03.23 @ 10:46) >  IMPRESSION:  CT HEAD: Acute intraparenchymal hematoma centered in the right posterior   parietal and occipital lobes measuring approximately 3.7 cm AP by 3.3 cm   TR by 5.2 cm cc. There is a large degree of surrounding vasogenic edema.   There is secondary mass effect upon the surrounding parenchyma and atrium   of the right ventricle. No midline shift.   Chronic changes as above.      ASSESSMENT:  89 y/o Singaporean speaking female from [facility] with PMHx of HTN presented to ED brought in by grandson for increased lethargy. As per grandson, patient was in usual state of health without any acute complaints until Sunday 7/2 when patient became lethargic, not conversant as usual, and had an episode of urinary incontinence. 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 Neurosurgery consulted, recommended no acute surgical intervention at this time. Patient made DNR/DNI, but family (daughter/son) would like to continue with further imaging for diagnosis.   ICH 2, MRS 1, NIHSS 13      PLAN:  Neuro:  - neuro checks q4hrs   - HOB 30 degrees, Neck midline position  - Maintain normothermia, PO acetaminophen for temp>38 C or pain  - no acute neurosurgical intervention   - pend CTA head/neck   - pend MRI brain w+w/o   - seizure ppx: Keppra 250mg qd  - stroke core measures   - pain control PRN  - OT recc NASH   - stroke neurology consulted   	  CV:  - SBP Goal 100-150  - Amlodipine 5mg   - PRN: hydralazine, labetalol  - pend echo    Pulm:  - Supplemental O2 PRN to maintain Spo2>92%    GI:  - pureed diet with thin liquids   - Bowel regimen: Senna   - PRN: Zofran   	  Gu:  - Voiding, IVL   - Monitor Electrolytes & Renal Function    Heme/Onc:  - Monitor H&H  - Mechanical DVT Prophylaxis: Maintain B/L LE sequential compression devices  - pend CT chest for mets w/u   - pend LE duplex   	  ID:  - Monitor WBC and Temperature  - +MSSA, bactroban x5days (7/4-7/8)	    Endo  - Monitor BGL, maintain <180    D/w Dr. Blackman and Dr. Molina 7/4 @15:08 HPI:  ED  Abelino  89 y/o Malaysian speaking female from [facility] with PMHx of HTN, s/p R hip surgery, s/p b/l eye surgery presented to ED brought in by verónica for increased lethargy. As per grandson, patient was in usual state of health without any acute complaints until Sunday 7/2 when patient became lethargic, not conversant as usual, and had an episode of urinary incontinence. Denies any episodes of incontinence in the past. 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 Neurosurgery consulted, recommended no acute surgical intervention at this time. Ambulates with walker at baseline. Patient currently c/o right hip pain. Grandson reportedly denies recent traumas, accidents, or falls.  (03 Jul 2023 14:56)    OVERNIGHT EVENTS: Patient doing well, neuro exam stable. Swallow eval done, passed for pureed diet with thin liquids as tolerated. OT recc NASH. Stroke neurology consulted.     Vital Signs Last 24 Hrs  T(C): 37.4 (04 Jul 2023 11:00), Max: 37.8 (03 Jul 2023 23:00)  T(F): 99.3 (04 Jul 2023 11:00), Max: 100 (03 Jul 2023 23:00)  HR: 82 (04 Jul 2023 11:00) (63 - 83)  BP: 143/61 (04 Jul 2023 11:00) (102/84 - 200/84)  BP(mean): 85 (04 Jul 2023 11:00) (65 - 100)  RR: 21 (04 Jul 2023 11:00) (15 - 25)  SpO2: 100% (04 Jul 2023 11:00) (96% - 100%)    Parameters below as of 04 Jul 2023 11:00  Patient On (Oxygen Delivery Method): room air    I&O's Summary    03 Jul 2023 07:01  -  04 Jul 2023 07:00  --------------------------------------------------------  IN: 550 mL / OUT: 650 mL / NET: -100 mL    04 Jul 2023 07:01  -  04 Jul 2023 13:39  --------------------------------------------------------  IN: 225 mL / OUT: 0 mL / NET: 225 mL    PHYSICAL EXAM:  General: NAD, pt is comfortably sitting up in bed, on room air  HEENT: EOMI b/l, face symmetric, tongue midline, neck FROM  Cardiovascular: Regular rate and rhythm  Respiratory: nonlabored breathing, normal chest rise  GI: abdomen soft, nontender, nondistended  Neuro: AAOx1 to name, verbalizes name, Malaysian speaking   intermittently opens eyes spontaneously, effort dependent exam   unable to follow commands, b/l UE 4+/5 without drift, b/l LE 2/5  unable to assess sensation due to mental status  Extremities: distal pulses 2+ x4      TUBES/LINES:  [] [place]  [] Wound Drains  [] Others      DIET:  [] NPO  [x] Mechanical  [] Tube feeds    LABS:                        10.7   12.90 )-----------( 287      ( 04 Jul 2023 01:56 )             32.4     07-04    137  |  102  |  21.8<H>  ----------------------------<  117<H>  3.9   |  20.0<L>  |  0.97    Ca    8.1<L>      04 Jul 2023 01:56  Phos  4.3     07-04  Mg     2.3     07-04    TPro  6.9  /  Alb  3.3  /  TBili  0.8  /  DBili  x   /  AST  20  /  ALT  17  /  AlkPhos  113  07-04  Urinalysis Basic - ( 04 Jul 2023 01:56 )  Color: x / Appearance: x / SG: x / pH: x  Gluc: 117 mg/dL / Ketone: x  / Bili: x / Urobili: x   Blood: x / Protein: x / Nitrite: x   Leuk Esterase: x / RBC: x / WBC x   Sq Epi: x / Non Sq Epi: x / Bacteria: x    Allergies  No Known Allergies      MEDICATIONS:  levETIRAcetam  IVPB 250 milliGRAM(s) IV Intermittent <User Schedule>  amLODIPine   Tablet 5 milliGRAM(s) Oral daily  chlorhexidine 2% Cloths 1 Application(s) Topical <User Schedule>  hydrALAZINE Injectable 5 milliGRAM(s) IV Push every 4 hours PRN  labetalol Injectable 5 milliGRAM(s) IV Push every 4 hours PRN  mupirocin 2% Nasal 1 Application(s) Both Nostrils every 12 hours  multivitamin 1 Tablet(s) Oral daily    RADIOLOGY & ADDITIONAL TESTS:  < from: CT Head No Cont (07.03.23 @ 19:19) >  IMPRESSION:  No significant intervalchange in right parietal and parenchyma   hemorrhage with local subarachnoid extension of blood. No new site of   bleeding or progressive mass effect. No midline shift.  Advanced chronic microvascular ischemic changes.    < from: CT Abdomen and Pelvis w/ IV Cont (07.03.23 @ 10:54) >  IMPRESSION:  1. 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  2. No evidence of acute inflammatory or obstructive process in the   abdomen and pelvis.    < from: CT Cervical Spine No Cont (07.03.23 @ 10:47) >  CT CERVICAL SPINE: No acute abnormality. Chronic changes as above.    < from: CT Head No Cont (07.03.23 @ 10:46) >  IMPRESSION:  CT HEAD: Acute intraparenchymal hematoma centered in the right posterior   parietal and occipital lobes measuring approximately 3.7 cm AP by 3.3 cm   TR by 5.2 cm cc. There is a large degree of surrounding vasogenic edema.   There is secondary mass effect upon the surrounding parenchyma and atrium   of the right ventricle. No midline shift.   Chronic changes as above.      ASSESSMENT:  89 y/o Malaysian speaking female from [facility] with PMHx of HTN presented to ED brought in by grandson for increased lethargy. As per grandson, patient was in usual state of health without any acute complaints until Sunday 7/2 when patient became lethargic, not conversant as usual, and had an episode of urinary incontinence. 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 Neurosurgery consulted, recommended no acute surgical intervention at this time. Patient made DNR/DNI, but family (daughter/son) would like to continue with further imaging for diagnosis.   ICH 2, MRS 1, NIHSS 13      PLAN:  Neuro:  - neuro checks q4hrs   - HOB 30 degrees, Neck midline position  - Maintain normothermia, PO acetaminophen for temp>38 C or pain  - no acute neurosurgical intervention   - pend CTA head/neck   - pend MRI brain w+w/o   - seizure ppx: Keppra 250mg qd  - stroke core measures   - pain control PRN  - OT recc NASH   - stroke neurology consulted   	  CV:  - SBP Goal 100-150  - Amlodipine 5mg   - PRN: hydralazine, labetalol  - pend echo    Pulm:  - Supplemental O2 PRN to maintain Spo2>92%    GI:  - pureed diet with thin liquids   - Bowel regimen: Senna   - PRN: Zofran   	  Gu:  - Voiding, IVL   - Monitor Electrolytes & Renal Function    Heme/Onc:  - Monitor H&H  - Mechanical DVT Prophylaxis: Maintain B/L LE sequential compression devices  - pend CT chest for mets w/u   - pend LE duplex   	  ID:  - Monitor WBC and Temperature  - +MSSA, bactroban x5days (7/4-7/8)	    Endo  - Monitor BGL, maintain <180    D/w Dr. Blackman and Dr. 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 Differential includes amyloid vs underlying mass vs heme conversion of the stroke.  Scores on admission: ICH 2, MRS 1, NIHSS 13, GCS 12.  PMHx of HTN, s/p R hip surgery, s/p b/l eye surgery.    Plan:  neurochecks, cont  pending CTA - r/o vascular etiology of bleed, chest w/contrast as mets w/up  maintain -150, started on Amlodipine 5 daily today  Keppra for sz ppx - CrCL 24 + elderly age - use 250 once daily  diet as tolerated   rest as above    Time spent - 35 min, non-critical, included review of relevant history, clinical examination, review of data and images, discussion of treatment with the multidisciplinary team and any consultants involved in this patient’s care.

## 2023-07-04 NOTE — PHYSICAL THERAPY INITIAL EVALUATION ADULT - IMPAIRED TRANSFERS: SIT/STAND, REHAB EVAL
impaired balance/cognition/impaired coordination/impaired motor control/narrow base of support/impaired postural control

## 2023-07-04 NOTE — CONSULT NOTE ADULT - NS ATTEND AMEND GEN_ALL_CORE FT
Seen and examined with the ACP team. Plan discussed with ACPs.    I agree with assessment and plan  as written with modifications made above.    will follow with you    Leeroy Pretty MD PhD   [id number]

## 2023-07-04 NOTE — CONSULT NOTE ADULT - SUBJECTIVE AND OBJECTIVE BOX
Preliminary note, official note pending attending review/signature.                               [facility] Stroke Team  CC: increased lethargy        HPI  89 y/o Brazilian speaking female from [facility] with PMHx of HTN, s/p R hip surgery, s/p b/l eye surgery presented to ED brought in by verónica for increased lethargy. As per grandson, patient was in usual state of health without any acute complaints until Sunday 7/2/23 when patient became lethargic, not conversant as usual, and had an episode of urinary incontinence. Denied any episodes of incontinence in the past. 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 Neurosurgery consulted, recommended no acute surgical intervention at this time.  Stroke neurology consulted.     PAST MEDICAL & SURGICAL HISTORY:  HTN (hypertension)  Status post hip surgery  S/P eye surgery    MEDICATIONS  (STANDING):  amLODIPine   Tablet 5 milliGRAM(s) Oral daily  chlorhexidine 2% Cloths 1 Application(s) Topical <User Schedule>  levETIRAcetam  IVPB 250 milliGRAM(s) IV Intermittent <User Schedule>  multivitamin 1 Tablet(s) Oral daily  mupirocin 2% Nasal 1 Application(s) Both Nostrils every 12 hours    MEDICATIONS  (PRN):  hydrALAZINE Injectable 5 milliGRAM(s) IV Push every 4 hours PRN SBP>150  labetalol Injectable 5 milliGRAM(s) IV Push every 4 hours PRN SBP>150      Allergies  No Known Allergies  Intolerances    SOCIAL HISTORY:  no tob,   no alcohol   no drugs    FAMILY HISTORY:    ROS: 14 point ROS negative other than what is present in HPI or below    Vital Signs Last 24 Hrs  T(C): 36.6 (04 Jul 2023 14:00), Max: 37.8 (03 Jul 2023 23:00)  T(F): 97.9 (04 Jul 2023 14:00), Max: 100 (03 Jul 2023 23:00)  HR: 78 (04 Jul 2023 14:00) (63 - 83)  BP: 144/68 (04 Jul 2023 14:00) (102/84 - 200/84)  BP(mean): 90 (04 Jul 2023 14:00) (65 - 100)  RR: 20 (04 Jul 2023 14:00) (15 - 25)  SpO2: 99% (04 Jul 2023 14:00) (96% - 100%)    Parameters below as of 04 Jul 2023 14:00  Patient On (Oxygen Delivery Method): room air      EXAM PENDING     General: NAD    Detailed Neurologic Exam:    Mental status: The patient is awake and alert and has normal attention span.  The patient is fully oriented in 3 spheres. The patient is oriented to current events. The patient is able to name objects, follow commands, repeat sentences.    Cranial nerves: Pupils equal and react symmetrically to light. There is no visual field deficit to confrontation. Extraocular motion is full with no nystagmus. There is no ptosis. Facial sensation is intact. Facial musculature is symmetric. Palate elevates symmetrically. Tongue is midline.    Motor: There is normal bulk and tone.  There is no tremor.  Strength is 5/5 in the right arm and leg.   Strength is 5/5 in the left arm and leg.    Sensation: Intact to light touch and pin in 4 extremities    Reflexes: 1-2+ throughout and plantar responses are flexor.    Cerebellar: There is no dysmetria on finger to nose testing.    Gait : deferred    [facility] SS:  DATE: 7/4/23  TIME:  1A: Level of consciousness (0-3):   1B: Questions (0-2):   1C: Commands (0-2):   2: Gaze (0-2):   3: Visual fields (0-3):   4: Facial palsy (0-3):   MOTOR:  5A: Left arm motor drift (0-4):   5B: Right arm motor drift (0-4):   6A: Left leg motor drift (0-4):   6B: Right leg motor drift (0-4):   7: Limb ataxia (0-2):   SENSORY:  8: Sensation (0-2):   SPEECH:  9: Language (0-3):   10: Dysarthria (0-2):   EXTINCTION:  11: Extinction/inattention (0-2):     TOTAL SCORE:     prehospital mRS=      LABS:                         10.7   12.90 )-----------( 287      ( 04 Jul 2023 01:56 )             32.4       07-04    137  |  102  |  21.8<H>  ----------------------------<  117<H>  3.9   |  20.0<L>  |  0.97    Ca    8.1<L>      04 Jul 2023 01:56  Phos  4.3     07-04  Mg     2.3     07-04    TPro  6.9  /  Alb  3.3  /  TBili  0.8  /  DBili  x   /  AST  20  /  ALT  17  /  AlkPhos  113  07-04          Lipid panel: pending       A1C: pending     RADIOLOGY & ADDITIONAL STUDIES (independently reviewed unless otherwise noted):  CT Head No Cont (07.03.23 @ 19:19)   IMPRESSION:  No significant intervalchange in right parietal and parenchyma   hemorrhage with local subarachnoid extension of blood. No new site of   bleeding or progressive mass effect. No midline shift.  Advanced chronic microvascular ischemic changes.        Preliminary note, official note pending attending review/signature.                               [facility] Stroke Team  CC: increased lethargy      Ipad was used.  HPI  89 y/o Faroese speaking female from [facility] with PMHx of HTN, s/p R hip surgery, s/p b/l eye surgery presented to ED brought in by grandson for increased lethargy. As per grandson, patient was in usual state of health without any acute complaints until Sunday 7/2/23 when patient became lethargic, not conversant as usual, and had an episode of urinary incontinence. Denied any episodes of incontinence in the past. 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 Neurosurgery consulted, recommended no acute surgical intervention at this time.  Stroke neurology consulted. At time of presentation daughter at bedside stated that her mother did fall a little while ago back at home in [facility]. Stated that patient needed some assistance with showering and getting changed at home and used a cane to walk.      PAST MEDICAL & SURGICAL HISTORY:  HTN (hypertension)  Status post hip surgery  S/P eye surgery    MEDICATIONS  (STANDING):  amLODIPine   Tablet 5 milliGRAM(s) Oral daily  chlorhexidine 2% Cloths 1 Application(s) Topical <User Schedule>  levETIRAcetam  IVPB 250 milliGRAM(s) IV Intermittent <User Schedule>  multivitamin 1 Tablet(s) Oral daily  mupirocin 2% Nasal 1 Application(s) Both Nostrils every 12 hours    MEDICATIONS  (PRN):  hydrALAZINE Injectable 5 milliGRAM(s) IV Push every 4 hours PRN SBP>150  labetalol Injectable 5 milliGRAM(s) IV Push every 4 hours PRN SBP>150      Allergies  No Known Allergies  Intolerances    SOCIAL HISTORY:  no tob,   no alcohol   no drugs    FAMILY HISTORY:  no family history of stroke    ROS: 14 point ROS negative other than what is present in HPI or below    Vital Signs Last 24 Hrs  T(C): 36.6 (04 Jul 2023 14:00), Max: 37.8 (03 Jul 2023 23:00)  T(F): 97.9 (04 Jul 2023 14:00), Max: 100 (03 Jul 2023 23:00)  HR: 78 (04 Jul 2023 14:00) (63 - 83)  BP: 144/68 (04 Jul 2023 14:00) (102/84 - 200/84)  BP(mean): 90 (04 Jul 2023 14:00) (65 - 100)  RR: 20 (04 Jul 2023 14:00) (15 - 25)  SpO2: 99% (04 Jul 2023 14:00) (96% - 100%)    Parameters below as of 04 Jul 2023 14:00  Patient On (Oxygen Delivery Method): room air      General: NAD. Seen in bed with daughter at bedside.    Detailed Neurologic Exam:    Mental status: The patient is awake and oriented to self and place, but unable to state year or month. The patient is unable to name objects, follow commands, repeat sentences.    Cranial nerves: Unable to fully participate in eye exam due to frequent closing of eyes and pain from light. Hx of bilateral cataract surgery. No visual neglect. There is no ptosis. Facial sensation is intact. Facial musculature is symmetric.     Motor: There is normal bulk and tone.  There is no tremor.  RUE and LUE- move spontaneously antigravity   RLE and LLE- withdraw to pain and move within bed    Sensation: Localizes to pain in all 4 extremities.    Cerebellar: unable to assess    Gait : deferred    [facility] SS: 5  DATE: 7/4/23  TIME: 16:30  1A: Level of consciousness (0-3): 0  1B: Questions (0-2): 0  1C: Commands (0-2): 2  2: Gaze (0-2): 0  3: Visual fields (0-3): 0  4: Facial palsy (0-3): 0  MOTOR:  5A: Left arm motor drift (0-4): 0  5B: Right arm motor drift (0-4): 0  6A: Left leg motor drift (0-4): 1  6B: Right leg motor drift (0-4): 1  7: Limb ataxia (0-2): 0  SENSORY:  8: Sensation (0-2): 0  SPEECH:  9: Language (0-3): 1  10: Dysarthria (0-2): 0  EXTINCTION:  11: Extinction/inattention (0-2): 0    TOTAL SCORE: 5    prehospital mRS= 2      LABS:                         10.7   12.90 )-----------( 287      ( 04 Jul 2023 01:56 )             32.4       07-04    137  |  102  |  21.8<H>  ----------------------------<  117<H>  3.9   |  20.0<L>  |  0.97    Ca    8.1<L>      04 Jul 2023 01:56  Phos  4.3     07-04  Mg     2.3     07-04    TPro  6.9  /  Alb  3.3  /  TBili  0.8  /  DBili  x   /  AST  20  /  ALT  17  /  AlkPhos  113  07-04          Lipid panel: pending       A1C: pending     RADIOLOGY & ADDITIONAL STUDIES (independently reviewed unless otherwise noted):  CT Head No Cont (07.03.23 @ 19:19)   IMPRESSION:  No significant intervalchange in right parietal and parenchyma   hemorrhage with local subarachnoid extension of blood. No new site of   bleeding or progressive mass effect. No midline shift.  Advanced chronic microvascular ischemic changes.        [facility] Stroke Team  Consult Note    CC: increased lethargy      Ipad was used.  HPI  89 y/o Citizen of [place] speaking female from [facility] with PMHx of HTN, s/p R hip surgery, s/p b/l eye surgery presented to ED brought in by grandson for increased lethargy. As per grandson, patient was in usual state of health without any acute complaints until Sunday 7/2/23 when patient became lethargic, not conversant as usual, and had an episode of urinary incontinence. Denied any episodes of incontinence in the past. 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 Neurosurgery consulted, recommended no acute surgical intervention at this time.  Stroke neurology consulted. At time of presentation daughter at bedside stated that her mother did fall a little while ago back at home in [facility]. Stated that patient needed some assistance with showering and getting changed at home and used a cane to walk.      PAST MEDICAL & SURGICAL HISTORY:  HTN (hypertension)  Status post hip surgery  S/P eye surgery    MEDICATIONS  (STANDING):  amLODIPine   Tablet 5 milliGRAM(s) Oral daily  chlorhexidine 2% Cloths 1 Application(s) Topical <User Schedule>  levETIRAcetam  IVPB 250 milliGRAM(s) IV Intermittent <User Schedule>  multivitamin 1 Tablet(s) Oral daily  mupirocin 2% Nasal 1 Application(s) Both Nostrils every 12 hours    MEDICATIONS  (PRN):  hydrALAZINE Injectable 5 milliGRAM(s) IV Push every 4 hours PRN SBP>150  labetalol Injectable 5 milliGRAM(s) IV Push every 4 hours PRN SBP>150      Allergies  No Known Allergies  Intolerances    SOCIAL HISTORY:  no tob,   no alcohol   no drugs    FAMILY HISTORY:  no family history of stroke    ROS: 14 point ROS negative other than what is present in HPI or below    Vital Signs Last 24 Hrs  T(C): 36.6 (04 Jul 2023 14:00), Max: 37.8 (03 Jul 2023 23:00)  T(F): 97.9 (04 Jul 2023 14:00), Max: 100 (03 Jul 2023 23:00)  HR: 78 (04 Jul 2023 14:00) (63 - 83)  BP: 144/68 (04 Jul 2023 14:00) (102/84 - 200/84)  BP(mean): 90 (04 Jul 2023 14:00) (65 - 100)  RR: 20 (04 Jul 2023 14:00) (15 - 25)  SpO2: 99% (04 Jul 2023 14:00) (96% - 100%)    Parameters below as of 04 Jul 2023 14:00  Patient On (Oxygen Delivery Method): room air      General: NAD. Seen in bed with daughter at bedside.    Detailed Neurologic Exam:    Mental status: The patient is awake and oriented to self and place, but unable to state year or month. The patient is unable to name objects, follow commands, repeat sentences.    Cranial nerves: Unable to fully participate in eye exam due to frequent closing of eyes and pain from light. Hx of bilateral cataract surgery. No visual neglect. There is no ptosis. Facial sensation is intact. Facial musculature is symmetric.     Motor: There is normal bulk and tone.  There is no tremor.  RUE and LUE- move spontaneously antigravity   RLE and LLE- withdraw to pain and move within bed    Sensation: Localizes to pain in all 4 extremities.    Cerebellar: unable to assess    Gait : deferred    [facility] SS: 5  DATE: 7/4/23  TIME: 16:30  1A: Level of consciousness (0-3): 0  1B: Questions (0-2): 0  1C: Commands (0-2): 2  2: Gaze (0-2): 0  3: Visual fields (0-3): 0  4: Facial palsy (0-3): 0  MOTOR:  5A: Left arm motor drift (0-4): 0  5B: Right arm motor drift (0-4): 0  6A: Left leg motor drift (0-4): 1  6B: Right leg motor drift (0-4): 1  7: Limb ataxia (0-2): 0  SENSORY:  8: Sensation (0-2): 0  SPEECH:  9: Language (0-3): 1  10: Dysarthria (0-2): 0  EXTINCTION:  11: Extinction/inattention (0-2): 0    TOTAL SCORE: 5    prehospital mRS= 2      LABS:                         10.7   12.90 )-----------( 287      ( 04 Jul 2023 01:56 )             32.4       07-04    137  |  102  |  21.8<H>  ----------------------------<  117<H>  3.9   |  20.0<L>  |  0.97    Ca    8.1<L>      04 Jul 2023 01:56  Phos  4.3     07-04  Mg     2.3     07-04    TPro  6.9  /  Alb  3.3  /  TBili  0.8  /  DBili  x   /  AST  20  /  ALT  17  /  AlkPhos  113  07-04    Lipid panel: pending       A1C: pending     RADIOLOGY & ADDITIONAL STUDIES (independently reviewed unless otherwise noted):  CT Head No Cont (07.03.23 @ 19:19)   IMPRESSION:  No significant interval change in right parietal and parenchymal   hemorrhage with local subarachnoid extension of blood. No new site of   bleeding or progressive mass effect. No midline shift.  Advanced chronic microvascular ischemic changes.

## 2023-07-04 NOTE — SWALLOW BEDSIDE ASSESSMENT ADULT - COMMENTS
As per MD note; "89 y/o Estonian speaking female from [facility] with PMHx of HTN presented to ED brought in by grandson for increased lethargy. As per grandson, patient was in usual state of health without any acute complaints until Sunday 7/2 when patient became lethargic, not conversant as usual, and had an episode of urinary incontinence. 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 Neurosurgery consulted, recommended no acute surgical intervention at this time. Patient made DNR/DNI, but family (daughter/son) would like to continue with further imaging for diagnosis." Daughter declined additional trials

## 2023-07-04 NOTE — PHYSICAL THERAPY INITIAL EVALUATION ADULT - LEVEL OF INDEPENDENCE: BED TO CHAIR, REHAB EVAL
pt pushing aggressivley against attempts to stand, max a x 2 for attempt with no change, inability to achieve upright posture with or s RW and fam assist/unable to perform

## 2023-07-04 NOTE — CONSULT NOTE ADULT - ASSESSMENT
INCOMPLETE  ASSESSMENT: 89 y/o Urdu speaking female from [facility] with PMHx of HTN, s/p R hip surgery, s/p b/l eye surgery presented to ED brought in by verónica for increased lethargy. As per grandson, patient was in usual state of health without any acute complaints until Sunday 7/2/23 when patient became lethargic, not conversant as usual, and had an episode of urinary incontinence. Denied any episodes of incontinence in the past. 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     NEURO: neurologically ---, Repeat head CT was done on 7/3/23 showing no change in right parietal and parenchymal hemorrgahe w/ local SAH extension. Continue close monitoring for neurologic deterioration , stroke neuro checks q 2 ,SBP goal 100-150 per neuro sx recommendations , Neurosurgery consulted, recommended no acute surgical intervention at this time. titrate statin to LDL goal less than 70, MRI Brain w/ and w/out, MRA head w/out and MRA neck w/. Physical therapy/OT/Speech eval/treatment.     ANTITHROMBOTIC THERAPY:     PULMONARY: CXR ___ , protecting airway, saturating well     CARDIOVASCULAR: TTE pending, cardiac monitoring                             GASTROINTESTINAL:  dysphagia screen- fail     Diet:     RENAL: BUN/Cr 21.8/0.97. monitor urine output, maintain adequate hydration       Na Goal:  135-145    HEMATOLOGY: H/H 10.7/32.4, monitor for signs and symptoms of further anemia. Platelets 287. patient should have all age and risk appropriate malignancy screenings with PCP or sooner if clinically suspected      DVT ppx: Heparin s.c [] LMWH [] - SCD at this time. Chemoppx on hold due to bleed    ID: afebrile, leukocytosis 12.90, monitor for si/sx of infection     OTHER: CT chest workup for mets due to benign appearing ovarian mass on CT abdomen and pelvis. condition and plan of care d/w patient, questions and concerns addressed. Suggest goals of care.    DISPOSITION: Rehab or home depending on PT eval once stable and workup is complete      CORE MEASURES:        Admission NIHSS:      Tenecteplase : [] YES [x] NO      LDL/HDL/A1C:     Depression Screen- if depression hx and/or present -p     Statin Therapy: as noted     Dysphagia Screen: [] PASS [x] FAIL     Smoking [] YES [x] NO      Afib [] YES [x] NO     Stroke Education [] YES [] NO- ordered and pending    Obtain screening lower extremity venous ultrasound in patients who meet 1 or more of the following criteria as patient is high risk for DVT/PE on admission:   [] History of DVT/PE  []Hypercoagulable states (Factor V Leiden, Cancer, OCP, etc. )  []Prolonged immobility (hemiplegia/hemiparesis/post operative or any other extended immobilization)  [] Transferred from outside facility (Rehab or Long term care)  [] Age </= to 50 ASSESSMENT: 89 y/o Zambian speaking female from [facility] with PMHx of HTN, s/p R hip surgery, s/p b/l eye surgery presented to ED brought in by grandson for increased lethargy. As per grandson, patient was in usual state of health without any acute complaints until Sunday 7/2/23 when patient became lethargic, not conversant as usual, and had an episode of urinary incontinence. Denied any episodes of incontinence in the past. Daughter at bedside stated that she fell a little while ago while back home in [facility]. 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     NEURO: Repeat head CT was done on 7/3/23 showing no change in right parietal and parenchymal hemorrhage w/ local SAH extension. Continue close monitoring for neurologic deterioration , stroke neuro checks per neuro ICU team ,SBP goal 100-150 per neuro sx recommendations , Neurosurgery consulted, recommended no acute surgical intervention at this time. titrate statin to LDL goal less than 70, MRI Brain w/out per family wishes. Plan discussed with Dr. Molina at bedside. Physical therapy/OT/Speech eval/treatment.     ANTITHROMBOTIC THERAPY: Not permitted at this time due to parieto-occipital intraparenchymal hemorrhage and no neurological indication at this time.     PULMONARY:  protecting airway, saturating well     CARDIOVASCULAR: TTE pending, cardiac monitoring                             GASTROINTESTINAL:  dysphagia screen- pass, advance as tolerated     Diet: Puree    RENAL: BUN/Cr 21.8/0.97. monitor urine output, maintain adequate hydration       Na Goal:  135-145    HEMATOLOGY: H/H 10.7/32.4, monitor for signs and symptoms of further anemia. Platelets 287. patient should have all age and risk appropriate malignancy screenings with PCP or sooner if clinically suspected      DVT ppx: Heparin s.c [] LMWH [] - SCD at this time. Chemoppx on hold due to IPH    ID: afebrile, leukocytosis 12.90, monitor for si/sx of infection     OTHER: condition and plan of care d/w patient, Dr. Molina, and daughter at bedside using  IPAD, questions and concerns addressed. Suggest goals of care at this time.    DISPOSITION: Rehab or home depending on PT eval once stable and workup is complete      CORE MEASURES:        Admission NIHSS:      Tenecteplase : [] YES [x] NO      LDL/HDL/A1C: pending     Depression Screen- if depression hx and/or present -p     Statin Therapy: as noted     Dysphagia Screen: [] PASS [x] FAIL     Smoking [] YES [x] NO      Afib [] YES [x] NO     Stroke Education [] YES [] NO- ordered and pending    Obtain screening lower extremity venous ultrasound in patients who meet 1 or more of the following criteria as patient is high risk for DVT/PE on admission:   [] History of DVT/PE  []Hypercoagulable states (Factor V Leiden, Cancer, OCP, etc. )  []Prolonged immobility (hemiplegia/hemiparesis/post operative or any other extended immobilization)  [] Transferred from outside facility (Rehab or Long term care)  [] Age </= to 50 ASSESSMENT: 89 y/o Sammarinese speaking female from [facility] with PMHx of HTN, s/p R hip surgery, s/p b/l eye surgery presented to ED brought in by grandson for increased lethargy. As per grandson, patient was in usual state of health without any acute complaints until Sunday 7/2/23 when patient became lethargic, not conversant as usual, and had an episode of urinary incontinence. Denied any episodes of incontinence in the past. Daughter at bedside stated that she fell a little while ago while back home in [facility]. 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     NEURO: Repeat head CT was done on 7/3/23 showing no change in right parietal and parenchymal hemorrhage w/ local SAH extension. Continue close monitoring for neurologic deterioration , stroke neuro checks per neuro ICU team ,SBP goal 100-150 per neuro sx recommendations , Neurosurgery consulted, recommended no acute surgical intervention at this time. titrate statin to LDL goal less than 70, MRI Brain w/out per family wishes. Plan discussed with Dr. Molina at bedside. Physical therapy/OT/Speech eval/treatment. She has mild confusion, question baseline mental status    ANTITHROMBOTIC THERAPY: Not permitted at this time due to parieto-occipital intraparenchymal hemorrhage and no neurological indication at this time.     PULMONARY:  protecting airway, saturating well     CARDIOVASCULAR: TTE pending, cardiac monitoring                             GASTROINTESTINAL:  dysphagia screen- pass, advance as tolerated     Diet: Puree    RENAL: BUN/Cr 21.8/0.97. monitor urine output, maintain adequate hydration       Na Goal:  135-145    HEMATOLOGY: H/H 10.7/32.4, monitor for signs and symptoms of further anemia. Platelets 287. patient should have all age and risk appropriate malignancy screenings with PCP or sooner if clinically suspected      DVT ppx: Heparin s.c [] LMWH [] - SCD at this time. Chemoppx on hold due to IPH    ID: afebrile, leukocytosis 12.90, monitor for si/sx of infection     OTHER: condition and plan of care d/w patient, Dr. Molina, and daughter at bedside using  IPAD, questions and concerns addressed. Suggest reevaluate goals of care at this time.    DISPOSITION: Rehab or home depending on PT eval once stable and workup is complete      CORE MEASURES:        Admission NIHSS:      Tenecteplase : [] YES [x] NO      LDL/HDL/A1C: pending     Depression Screen- if depression hx and/or present -p     Statin Therapy: as noted     Dysphagia Screen: [] PASS [x] FAIL     Smoking [] YES [x] NO      Afib [] YES [x] NO     Stroke Education [] YES [] NO- ordered and pending    Obtain screening lower extremity venous ultrasound in patients who meet 1 or more of the following criteria as patient is high risk for DVT/PE on admission:   [] History of DVT/PE  []Hypercoagulable states (Factor V Leiden, Cancer, OCP, etc. )  []Prolonged immobility (hemiplegia/hemiparesis/post operative or any other extended immobilization)  [] Transferred from outside facility (Rehab or Long term care)  [] Age </= to 50

## 2023-07-04 NOTE — PROGRESS NOTE ADULT - CONVERSATION DETAILS
goc , plan of care discussed with daughter .   cpr / intubation discussed.   family wishes patient to be dnr/ dni but wishes to c/w medical tx

## 2023-07-04 NOTE — PROGRESS NOTE ADULT - ASSESSMENT
89 y/o Sinhala speaking female from [facility], with PMHx of HTN, s/p R hip surgery couple of years ago , s/p b/l eye surgery presented to ED brought in by grandson for increased lethargy. As per grandson, patient was in usual state of health without any acute complaints until Sunday 7/2/23 when patient became lethargic, not conversant as usual, and had an episode of urinary incontinence. Denied any episodes of incontinence in the past. 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 Neurosurgery consulted, recommended no acute surgical intervention at this time.  Stroke neurology consulted. At time of presentation daughter at bedside stated that her mother did fall a little while ago back at home in [facility]. Stated that patient needed some assistance with showering and getting changed at home and used a cane to walk.  ext movement movement limited due to pain. no facial droop noted. started on po feeds. patient noted with low grade fever and mild leukocytosis. downgraded to medicine 7/4/23       > ams /IPH / possible spontaneous / due to htn   - cth R posterior parieto-occipital IPH with vasogenic edema and mass effect  - repeat cth stable   - discussed with neuro team dr. castillo. cta h/n not recommended as it would not change the management and no intervention recommended at thsi time by neuro sx team   - MRI pending , family wanted to pursue mri   - neuro checks q 4   - monitor bp   - c/w levETIRAcetam  IVPB 250 milliGRAM(s)  - pt/ot / speech eval       >leukocytosis /  low grade fever   - UA neg 7/3   - check cxr   - will start on unasyn for now   - monitor / trned fever    > left ovary fibrous lesion   - incidental finding noted on ct   - follwo up as op for further w/u    > dvt ppx : scds     > goc dnr/ dni     >plan of care  discussed with michael in detail. unabel to discuss with pateint due to mentation. discussed with neuro icu / neuro team

## 2023-07-04 NOTE — PHYSICAL THERAPY INITIAL EVALUATION ADULT - PERTINENT HX OF CURRENT PROBLEM, REHAB EVAL
As per MD note: 89 y/o Upper sorbian speaking female from [facility] with PMHx of HTN, s/p R hip surgery, s/p b/l eye surgery presented to ED brought in by grandson for increased lethargy. As per grandson, patient was in usual state of health without any acute complaints until Sunday 7/2 when patient became lethargic, not conversant as usual, and had an episode of urinary incontinence. Denies any episodes of incontinence in the past. 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 Neurosurgery consulted, recommended no acute surgical intervention at this time. Ambulates with walker at baseline. Patient currently c/o right hip pain. Grandson reportedly denies recent traumas, accidents, or falls.

## 2023-07-04 NOTE — PROGRESS NOTE ADULT - SUBJECTIVE AND OBJECTIVE BOX
HPI: 89 y/o Khmer speaking female from [facility] with PMHx of HTN, s/p R hip surgery, s/p b/l eye surgery presented to ED brought in by grandoni for increased lethargy. As per grandson, patient was in usual state of health without any acute complaints until  when patient became lethargic, not conversant as usual, and had an episode of urinary incontinence. Denies any episodes of incontinence in the past. 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 Neurosurgery consulted, recommended no acute surgical intervention at this time. Ambulates with walker at baseline. Patient currently c/o right hip pain. Grandson reportedly denies recent traumas, accidents, or falls.     INTERVAL: Patient with stable exam and respiratory status overnight.   .     Vital Signs Last 24 Hrs  T(C): 37.7 (2023 02:00), Max: 37.8 (2023 23:00)  T(F): 99.9 (2023 02:00), Max: 100 (2023 23:00)  HR: 78 (2023 02:00) (63 - 83)  BP: 137/57 (2023 02:00) (102/84 - 200/84)  BP(mean): 82 (2023 02:00) (67 - 92)  RR: 15 (2023 02:00) (15 - 24)  SpO2: 96% (2023 02:00) (96% - 99%)    Parameters below as of 2023 00:00  Patient On (Oxygen Delivery Method): room air        PHYSICAL EXAM:  Constitutional: 89 y/o female awake, lethargic, in no acute distress.  Eyes:  R pupil 3mm brisk, L pupil surgical, Sclera anicteric, conjunctiva noninjected.  ENMT: Oropharyngeal mucosa moist, pink. Tongue midline.    Neck: Neck supple, FROM.    Respiratory: normal chest rise, nonlabored breathing  Cardiovascular: Regular rate  Gastrointestinal:  Soft, nontender, nondistended.   Vascular: Extremities warm, no ulcers, no discoloration of skin.   Neurological: AAOx1 to name, verbalizes name, limited speech  intermittently opens eyes spontaneously, effort dependent exam   unable to follow commands, b/l UE 4/5 without drift, b/l LE 2/5  unable to assess sensation due to mental status  Skin: Warm, dry, no erythema.    LABS:                        10.7   12.90 )-----------( 287      ( 2023 01:56 )             32.4         138  |  99  |  21.1<H>  ----------------------------<  111<H>  4.6   |  25.0  |  1.15    Ca    9.2      2023 09:59    TPro  7.7  /  Alb  3.9  /  TBili  0.8  /  DBili  x   /  AST  25  /  ALT  24  /  AlkPhos  133<H>  07-03      Urinalysis Basic - ( 2023 13:01 )    Color: Yellow / Appearance: Clear / S.010 / pH: x  Gluc: x / Ketone: Negative  / Bili: Negative / Urobili: Negative mg/dL   Blood: x / Protein: 100 mg/dL / Nitrite: Negative   Leuk Esterase: Negative / RBC: Negative /HPF / WBC 3-5 /HPF   Sq Epi: x / Non Sq Epi: x / Bacteria: Occasional         @ 07:01  -  07-04 @ 03:05  --------------------------------------------------------  IN: 150 mL / OUT: 450 mL / NET: -300 mL        RADIOLOGY & ADDITIONAL TESTS:      CT Head No Cont (23 @ 19:19)     IMPRESSION:    No significant intervalchange in right parietal and parenchyma   hemorrhage with local subarachnoid extension of blood. No new site of   bleeding or progressive mass effect. No midline shift.    Advanced chronic microvascular ischemic changes.      CT Abdomen and Pelvis w/ IV Cont (23 @ 10:54)     IMPRESSION:  1. 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  2. No evidence of acute inflammatory or obstructive process in the   abdomen and pelvis.

## 2023-07-04 NOTE — PHYSICAL THERAPY INITIAL EVALUATION ADULT - RANGE OF MOTION EXAMINATION, REHAB EVAL
LE assessment limited to functional due to pt tensing up/becoming ridgid seemingly in anticipation of pain/possible fear/bilateral upper extremity ROM was WNL (within normal limits)/bilateral lower extremity was ROM was WNL (within normal limits)

## 2023-07-05 DIAGNOSIS — I49.9 CARDIAC ARRHYTHMIA, UNSPECIFIED: ICD-10-CM

## 2023-07-05 DIAGNOSIS — I10 ESSENTIAL (PRIMARY) HYPERTENSION: ICD-10-CM

## 2023-07-05 LAB
A1C WITH ESTIMATED AVERAGE GLUCOSE RESULT: 5.4 % — SIGNIFICANT CHANGE UP (ref 4–5.6)
ALBUMIN SERPL ELPH-MCNC: 3.2 G/DL — LOW (ref 3.3–5.2)
ALP SERPL-CCNC: 119 U/L — SIGNIFICANT CHANGE UP (ref 40–120)
ALT FLD-CCNC: 17 U/L — SIGNIFICANT CHANGE UP
ANION GAP SERPL CALC-SCNC: 16 MMOL/L — SIGNIFICANT CHANGE UP (ref 5–17)
AST SERPL-CCNC: 27 U/L — SIGNIFICANT CHANGE UP
BILIRUB SERPL-MCNC: 0.6 MG/DL — SIGNIFICANT CHANGE UP (ref 0.4–2)
BUN SERPL-MCNC: 24.7 MG/DL — HIGH (ref 8–20)
CALCIUM SERPL-MCNC: 8.6 MG/DL — SIGNIFICANT CHANGE UP (ref 8.4–10.5)
CHLORIDE SERPL-SCNC: 104 MMOL/L — SIGNIFICANT CHANGE UP (ref 96–108)
CHOLEST SERPL-MCNC: 194 MG/DL — SIGNIFICANT CHANGE UP
CO2 SERPL-SCNC: 18 MMOL/L — LOW (ref 22–29)
CREAT SERPL-MCNC: 1.03 MG/DL — SIGNIFICANT CHANGE UP (ref 0.5–1.3)
EGFR: 49 ML/MIN/1.73M2 — LOW
ESTIMATED AVERAGE GLUCOSE: 108 MG/DL — SIGNIFICANT CHANGE UP (ref 68–114)
GLUCOSE SERPL-MCNC: 116 MG/DL — HIGH (ref 70–99)
HCT VFR BLD CALC: 35.5 % — SIGNIFICANT CHANGE UP (ref 34.5–45)
HDLC SERPL-MCNC: 55 MG/DL — SIGNIFICANT CHANGE UP
HGB BLD-MCNC: 11.1 G/DL — LOW (ref 11.5–15.5)
LIPID PNL WITH DIRECT LDL SERPL: 119 MG/DL — HIGH
MAGNESIUM SERPL-MCNC: 2.6 MG/DL — SIGNIFICANT CHANGE UP (ref 1.6–2.6)
MCHC RBC-ENTMCNC: 31.3 GM/DL — LOW (ref 32–36)
MCHC RBC-ENTMCNC: 31.3 PG — SIGNIFICANT CHANGE UP (ref 27–34)
MCV RBC AUTO: 100 FL — SIGNIFICANT CHANGE UP (ref 80–100)
NON HDL CHOLESTEROL: 139 MG/DL — HIGH
PHOSPHATE SERPL-MCNC: 3.8 MG/DL — SIGNIFICANT CHANGE UP (ref 2.4–4.7)
PLATELET # BLD AUTO: 218 K/UL — SIGNIFICANT CHANGE UP (ref 150–400)
POTASSIUM SERPL-MCNC: 3.9 MMOL/L — SIGNIFICANT CHANGE UP (ref 3.5–5.3)
POTASSIUM SERPL-SCNC: 3.9 MMOL/L — SIGNIFICANT CHANGE UP (ref 3.5–5.3)
PROT SERPL-MCNC: 7.2 G/DL — SIGNIFICANT CHANGE UP (ref 6.6–8.7)
RBC # BLD: 3.55 M/UL — LOW (ref 3.8–5.2)
RBC # FLD: 14.6 % — HIGH (ref 10.3–14.5)
SODIUM SERPL-SCNC: 138 MMOL/L — SIGNIFICANT CHANGE UP (ref 135–145)
TRIGL SERPL-MCNC: 102 MG/DL — SIGNIFICANT CHANGE UP
TROPONIN T SERPL-MCNC: <0.01 NG/ML — SIGNIFICANT CHANGE UP (ref 0–0.06)
TSH SERPL-MCNC: 5.87 UIU/ML — HIGH (ref 0.27–4.2)
WBC # BLD: 11.38 K/UL — HIGH (ref 3.8–10.5)
WBC # FLD AUTO: 11.38 K/UL — HIGH (ref 3.8–10.5)

## 2023-07-05 PROCEDURE — 99222 1ST HOSP IP/OBS MODERATE 55: CPT | Mod: 25

## 2023-07-05 PROCEDURE — 99233 SBSQ HOSP IP/OBS HIGH 50: CPT

## 2023-07-05 PROCEDURE — 99223 1ST HOSP IP/OBS HIGH 75: CPT

## 2023-07-05 PROCEDURE — 99232 SBSQ HOSP IP/OBS MODERATE 35: CPT

## 2023-07-05 PROCEDURE — 93010 ELECTROCARDIOGRAM REPORT: CPT

## 2023-07-05 RX ORDER — DILTIAZEM HCL 120 MG
10 CAPSULE, EXT RELEASE 24 HR ORAL ONCE
Refills: 0 | Status: COMPLETED | OUTPATIENT
Start: 2023-07-05 | End: 2023-07-05

## 2023-07-05 RX ORDER — ACETAMINOPHEN 500 MG
650 TABLET ORAL ONCE
Refills: 0 | Status: COMPLETED | OUTPATIENT
Start: 2023-07-05 | End: 2023-07-05

## 2023-07-05 RX ORDER — METOPROLOL TARTRATE 50 MG
5 TABLET ORAL ONCE
Refills: 0 | Status: COMPLETED | OUTPATIENT
Start: 2023-07-05 | End: 2023-07-05

## 2023-07-05 RX ORDER — DILTIAZEM HCL 120 MG
60 CAPSULE, EXT RELEASE 24 HR ORAL EVERY 8 HOURS
Refills: 0 | Status: DISCONTINUED | OUTPATIENT
Start: 2023-07-05 | End: 2023-07-06

## 2023-07-05 RX ORDER — DILTIAZEM HCL 120 MG
30 CAPSULE, EXT RELEASE 24 HR ORAL EVERY 6 HOURS
Refills: 0 | Status: DISCONTINUED | OUTPATIENT
Start: 2023-07-05 | End: 2023-07-05

## 2023-07-05 RX ADMIN — MUPIROCIN 1 APPLICATION(S): 20 OINTMENT TOPICAL at 06:26

## 2023-07-05 RX ADMIN — MUPIROCIN 1 APPLICATION(S): 20 OINTMENT TOPICAL at 17:47

## 2023-07-05 RX ADMIN — Medication 5 MILLIGRAM(S): at 04:03

## 2023-07-05 RX ADMIN — Medication 30 MILLIGRAM(S): at 06:26

## 2023-07-05 RX ADMIN — Medication 650 MILLIGRAM(S): at 06:26

## 2023-07-05 RX ADMIN — Medication 1 TABLET(S): at 13:53

## 2023-07-05 RX ADMIN — CHLORHEXIDINE GLUCONATE 1 APPLICATION(S): 213 SOLUTION TOPICAL at 05:53

## 2023-07-05 RX ADMIN — Medication 60 MILLIGRAM(S): at 14:13

## 2023-07-05 RX ADMIN — SENNA PLUS 2 TABLET(S): 8.6 TABLET ORAL at 22:41

## 2023-07-05 RX ADMIN — AMPICILLIN SODIUM AND SULBACTAM SODIUM 200 GRAM(S): 250; 125 INJECTION, POWDER, FOR SUSPENSION INTRAMUSCULAR; INTRAVENOUS at 06:24

## 2023-07-05 RX ADMIN — Medication 60 MILLIGRAM(S): at 22:41

## 2023-07-05 RX ADMIN — LEVETIRACETAM 400 MILLIGRAM(S): 250 TABLET, FILM COATED ORAL at 16:57

## 2023-07-05 RX ADMIN — AMPICILLIN SODIUM AND SULBACTAM SODIUM 200 GRAM(S): 250; 125 INJECTION, POWDER, FOR SUSPENSION INTRAMUSCULAR; INTRAVENOUS at 17:47

## 2023-07-05 RX ADMIN — Medication 10 MILLIGRAM(S): at 04:13

## 2023-07-05 NOTE — CONSULT NOTE ADULT - CONVERSATION DETAILS
Met with patient at bedside, introduced Palliative Care Team and Services at [facility].   present at time of encounter as well as Grandson Jonnathan  Phone call placed for Natalie (daughter) to be present during our [facility] meeting   Natalie De Santiago myself and the  discussed overall hospital course, diagnosis, their impression and decisions that have been made regarding the patient's ICH and options that were previously discussed between them and prior providers. Natalie explained she had a detailed conversation with the neuro team regarding her diagnosis and prognosis and at this time they decline surgical intervention and want to focus on her quality of life andbeing home with her. Natalie expressed her family in [facility] are all in support with the decision and her as the surrogate to move forward with trying to qualify the patient for home hospice. Also rediscussed code status at this time - confirmed she is DNR/DNI.   Overall, family hopefulcan return to [facility] to live out her days but they understand that may not happen and want to prepare with having home hospice in place if it's possible. Family available at home to care for patient in the off hours hospice is not present. In addition, natalie lives with her mother and is there 24/7.   Discussed case with Alma Delia Tapia NP on unit, RN, Dr. Flannery, and Cherri Ontiveros from [employer]. Cherri is going to reach out to [facility] and family for further conversations and see if patient can be approved for services.   Can consider transitioning the patient to comfort care prior to discharge while at [facility] if patient starts to become symptomatic and family agreeable

## 2023-07-05 NOTE — CONSULT NOTE ADULT - NS ATTEND AMEND GEN_ALL_CORE FT
Patient seen and examined by me.   89 y/o Azeri speaking female from [facility] with PMHx of HTN, s/p R hip surgery, s/p b/l eye surgery presented to ED brought in by verónica for increased lethargy. As per verónica, patient was in usual state of health without any acute complaints until Sunday 7/2 when patient became lethargic, not conversant as usual, and had an episode of urinary incontinence. Denies any episodes of incontinence in the past. 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 Neurosurgery consulted, recommended no acute surgical intervention at this time. Ambulates with walker at baseline. Patient currently c/o right hip pain. Grandson reportedly denies recent traumas, accidents, or falls.  (03 Jul 2023 14:56)    T(C): 36.4 (07-05-23 @ 07:40), Max: 37.5 (07-05-23 @ 04:38)  HR: 58 (07-05-23 @ 07:40) (56 - 181)  BP: 121/60 (07-05-23 @ 07:40) (107/56 - 185/73)  RR: 18 (07-05-23 @ 07:40) (16 - 21)  SpO2: 96% (07-05-23 @ 07:40) (94% - 100%)  Patient awake, not cooperating with the exam, nonverbal.    Chest- Bilateral Clear BS  Cardiac- S1 and S2  Abdomen- Soft  Patient's telemetry reviewed, heart showed multiple episodes of A-fib with RVR.  Patient currently in normal sinus rhythm.    Assessment/Plan:    Patient's TSH is slightly upper limits of normal.  Patient's echocardiogram unremarkable.  Patient currently on Cardizem 30 mg every 6    Patient is an elderly lady with a intracranial bleed who is a DNR/DNI, she is not a candidate for anticoagulation.    Recommendations:  1.  May continue rate control with Cardizem, try Cardizem 60 mg every 8 with parameters to hold for heart rate less than 60.  If needed may titrate the dose of the Cardizem.  May d/c Amlodopine  2.  Not a candidate for anticoagulation.    Discussed with Alma Delia Tapia, [facility]    Will sign off  I have discussed my recommendation with the PA which are outlined above.    ampicillin/sulbactam  IVPB      ampicillin/sulbactam  IVPB 3 Gram(s) IV Intermittent every 12 hours  chlorhexidine 2% Cloths 1 Application(s) Topical <User Schedule>  diltiazem    Tablet 60 milliGRAM(s) Oral every 8 hours  hydrALAZINE Injectable 5 milliGRAM(s) IV Push every 4 hours PRN  labetalol Injectable 5 milliGRAM(s) IV Push every 4 hours PRN  levETIRAcetam  IVPB 250 milliGRAM(s) IV Intermittent <User Schedule>  multivitamin 1 Tablet(s) Oral daily  mupirocin 2% Nasal 1 Application(s) Both Nostrils every 12 hours  senna 2 Tablet(s) Oral at bedtime

## 2023-07-05 NOTE — CONSULT NOTE ADULT - SUBJECTIVE AND OBJECTIVE BOX
Palliative Care Consult  HPI This is a 89 year old female PMHx HTN brought in by family for increase in lethargy and an episode of urinary incontinence. No reports of chest pain, sob, n/v/d/c/fever or chills.  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 Neurosurgery consulted, recommended no acute surgical intervention at this time. Ambulates with walker at baseline. Admitted for ICH. Palliative care consulted for GOC. See palliative encounter below for details on GOC meeting held today at bedside with  present.     <HPI:  ED  Abelino  89 y/o Bulgarian speaking female from [facility] with PMHx of HTN, s/p R hip surgery, s/p b/l eye surgery presented to ED brought in by grandson for increased lethargy. As per grandson, patient was in usual state of health without any acute complaints until Sunday 7/2 when patient became lethargic, not conversant as usual, and had an episode of urinary incontinence. Denies any episodes of incontinence in the past. 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 Neurosurgery consulted, recommended no acute surgical intervention at this time. Ambulates with walker at baseline. Patient currently c/o right hip pain. Grandson reportedly denies recent traumas, accidents, or falls.  (03 Jul 2023 14:56)>end of copied text      PERTINENT PMH REVIEWED: Yes     PAST MEDICAL & SURGICAL HISTORY:  HTN (hypertension)      Status post hip surgery      S/P eye surgery          SOCIAL HISTORY:                      Substance history: none                    Admitted from:  home                     Spiritism/spirituality:Zoroastrian                    Cultural concerns: from [facility], Living in [place] with family currently (daughter)Natalie who is her care taker - pt speaks Welsh only                      Surrogate/HCP/Guardian: Phone#:Natalie Gonzalez (daughter) [phone], secondary Grandson Jonnathan Gonzalez [phone]    FAMILY HISTORY:  No family history related to admission diagonsis    Allergies  No Known Allergies    ADVANCE DIRECTIVES/TREATMENT PREFERENCES:  DNR DNI  Reconfirmed today 7/5/23    Baseline ADLs (prior to admission):  Independent with assist    Karnofsky/Palliative Performance Status Version 2:  %20-30  http://npcrc.org/files/news/palliative_performance_scale_[email or web address]    Present Symptoms:   Unable to obtain due to poor mentation     Pain: Non verbal cues of pain absent             Character-            Duration-            Effect-            Factors-            Frequency-            Location-            Severity-    Pain AD Score:0  http://geriatrictoolkit.[facility]/cog/painad.pdf (press ctrl + left click to view)    Review of Systems: Reviewed  Unable to obtain due to poor mentation     MEDICATIONS  (STANDING):  ampicillin/sulbactam  IVPB      ampicillin/sulbactam  IVPB 3 Gram(s) IV Intermittent every 12 hours  chlorhexidine 2% Cloths 1 Application(s) Topical <User Schedule>  diltiazem    Tablet 60 milliGRAM(s) Oral every 8 hours  levETIRAcetam  IVPB 250 milliGRAM(s) IV Intermittent <User Schedule>  multivitamin 1 Tablet(s) Oral daily  mupirocin 2% Nasal 1 Application(s) Both Nostrils every 12 hours  senna 2 Tablet(s) Oral at bedtime    MEDICATIONS  (PRN):  hydrALAZINE Injectable 5 milliGRAM(s) IV Push every 4 hours PRN SBP>150  labetalol Injectable 5 milliGRAM(s) IV Push every 4 hours PRN SBP>150      PHYSICAL EXAM:    Vital Signs Last 24 Hrs  T(C): 36.4 (05 Jul 2023 07:40), Max: 37.5 (05 Jul 2023 04:38)  T(F): 97.5 (05 Jul 2023 07:40), Max: 99.5 (05 Jul 2023 04:38)  HR: 58 (05 Jul 2023 07:40) (56 - 181)  BP: 121/60 (05 Jul 2023 07:40) (107/56 - 185/73)  BP(mean): 73 (05 Jul 2023 04:38) (71 - 100)  RR: 18 (05 Jul 2023 07:40) (16 - 21)  SpO2: 96% (05 Jul 2023 07:40) (94% - 100%)    Parameters below as of 05 Jul 2023 07:40  Patient On (Oxygen Delivery Method): room air    General: confused    HEENT: dry mouth     Lungs: comfortable     CV: normal      GI:  incontinent     :   owusu    MSK: weakness  bedbound    Neuro: cognitive impairment    Skin: thin frail dry skin    LABS:                        11.1   11.38 )-----------( 218      ( 05 Jul 2023 04:53 )             35.5     07-05    138  |  104  |  24.7<H>  ----------------------------<  116<H>  3.9   |  18.0<L>  |  1.03    Ca    8.6      05 Jul 2023 04:53  Phos  3.8     07-05  Mg     2.6     07-05    TPro  7.2  /  Alb  3.2<L>  /  TBili  0.6  /  DBili  x   /  AST  27  /  ALT  17  /  AlkPhos  119  07-05      Urinalysis Basic - ( 05 Jul 2023 04:53 )    Color: x / Appearance: x / SG: x / pH: x  Gluc: 116 mg/dL / Ketone: x  / Bili: x / Urobili: x   Blood: x / Protein: x / Nitrite: x   Leuk Esterase: x / RBC: x / WBC x   Sq Epi: x / Non Sq Epi: x / Bacteria: x    I&O's Summary    04 Jul 2023 07:01  -  05 Jul 2023 07:00  --------------------------------------------------------  IN: 475 mL / OUT: 400 mL / NET: 75 mL    RADIOLOGY & ADDITIONAL STUDIES:  CT head 07.03.23  IMPRESSION:  CT HEAD: Acute intraparenchymal hematoma centered in the right posterior   parietal and occipital lobes measuring approximately 3.7 cm AP by 3.3 cm   TR by 5.2 cm cc. There is a large degree of surrounding vasogenic edema.   There is secondary mass effect upon the surrounding parenchyma and atrium   of the right ventricle. No midline shift.   Chronic changes as above.  CT CERVICAL SPINE: No acute abnormality. Chronic changes as above.

## 2023-07-05 NOTE — CHART NOTE - NSCHARTNOTEFT_GEN_A_CORE
Pt seen and examined at bedside this am after RN reporting pt converted into a fib on monitor -190  Encounter translated via RN. Daughter present.   Resting comfortably in NAD. Denies chest pain, SOB, n/v/d/c, abdominal pain, HA, dizziness, blurry vision.   Denies cardiac history, no hx of arrhythmias, has never seen a cardiologist.     VITAL SIGNS:  T(C): 37.5 (07-05-23 @ 04:38), Max: 37.5 (07-05-23 @ 04:38)  T(F): 99.5 (07-05-23 @ 04:38), Max: 99.5 (07-05-23 @ 04:38)  HR: 56 (07-05-23 @ 04:38) (56 - 181)  BP: 107/56 (07-05-23 @ 04:38) (107/56 - 185/73)  RR: 18 (07-05-23 @ 04:38) (16 - 25)  SpO2: 96% (07-05-23 @ 04:38) (94% - 100%)    PHYSICAL EXAM:  GENERAL: NAD, lying in bed comfortably  EYES: EOMI, conjunctiva and sclera clear  ENT: Moist mucous membranes  CHEST/LUNG: Unlabored respirations  HEART: Irregular, tachycardic, +S1, S2  EXTREMITIES:  2+ Peripheral Pulses, brisk capillary refill. No clubbing, cyanosis, or edema  MSK: Moving extremities spontaneously  SKIN: Warm, dry      A/P:  New onset a fib   -5mg IV lopressor given with poor effect  -10mg IV cardizem with improvement in HR  -Start 30mg PO cardizem q6hr  -EKG  -CBC, CMP, Mag, Phos, TSH, Trop  -Echo from 7/4 appreciated  -[id number] 4, defer AC at this time in setting of ICH until cleared by neurosx  -Cardiology consulted Pt seen and examined at bedside this am after RN reporting pt tachycardic on monitor -190  Encounter translated via RN. Daughter present.   Resting comfortably in NAD. Denies chest pain, SOB, n/v/d/c, abdominal pain, HA, dizziness, blurry vision.   Denies cardiac history, no hx of arrhythmias, has never seen a cardiologist.     VITAL SIGNS:  T(C): 37.5 (07-05-23 @ 04:38), Max: 37.5 (07-05-23 @ 04:38)  T(F): 99.5 (07-05-23 @ 04:38), Max: 99.5 (07-05-23 @ 04:38)  HR: 56 (07-05-23 @ 04:38) (56 - 181)  BP: 107/56 (07-05-23 @ 04:38) (107/56 - 185/73)  RR: 18 (07-05-23 @ 04:38) (16 - 25)  SpO2: 96% (07-05-23 @ 04:38) (94% - 100%)    PHYSICAL EXAM:  GENERAL: NAD, lying in bed comfortably  EYES: EOMI, conjunctiva and sclera clear  ENT: Moist mucous membranes  CHEST/LUNG: Unlabored respirations  HEART: Irregular, tachycardic, +S1, S2  EXTREMITIES:  2+ Peripheral Pulses, brisk capillary refill. No clubbing, cyanosis, or edema  MSK: Moving extremities spontaneously  SKIN: Warm, dry      A/P:  New onset a fib ?   -5mg IV lopressor given with poor effect  -10mg IV cardizem with improvement in HR  -Start 30mg PO cardizem q6hr  -EKG performed after event  -CBC, CMP, Mag, Phos, TSH, Trop  -Echo from 7/4 appreciated  -[id number] 4, defer AC at this time in setting of ICH until cleared by neurosx  -Cardiology consulted

## 2023-07-05 NOTE — CONSULT NOTE ADULT - ASSESSMENT
89 year old female found to have ICH, family declines neurosurgical intervention, opting for home hospice support     Problem/Recommendation 1:ICH  Monitor for further changes in mentation  Neurosurgery consulted- no neurosurgical intervention at this time  Family requesting for home hospice   If excessive secretions arise can consider Robinul PRN or atropine SL     Problem/Recommendation 2:AMS/Delirium  Consider trying to avoid/minimize deliriogenic drugs such as benzodiazepines and anticholinergics   Non pharmacologic options for delirium: Frequently orient patient, identify self, maintain consistent staffing and location   Limit stimulation, soft voice, soft lighting, gentle handling  Sleep disturbance support  Provide adequate sensory aides such as hearing aids, glasses, calendar, clock  Soft warm blankets  Bed alarm for safety  Can consider low dose ativan PRN for agitation if symptoms arise    Problem/Recommendation 3: Debility  Assist in ADLS  Maintain safety, fall, aspiration precautions  Set bed alarm, chair alarm for safety and fall prevention  Turn and Position in bed     Problem/Recommendation 4: Palliative Care Encounter  Met with patient at bedside, introduced Palliative Care Team and Services at [facility].   present at time of encounter as well as Grandoni De Santiago  Phone call placed for Christopher (daughter) to be present during our [facility] meeting   Christopher De Santiago myself and the  discussed overall hospital course, diagnosis, their impression and decisions that have been made regarding the patient's ICH and options that were previously discussed between them and prior providers. Christopher explained she had a detailed conversation with the neuro team regarding her diagnosis and prognosis and at this time they decline surgical intervention and want to focus on her quality of life andbeing home with her. Christopher expressed her family in [facility] are all in support with the decision and her as the surrogate to move forward with trying to qualify the patient for home hospice. Also rediscussed code status at this time - confirmed she is DNR/DNI.   Overall, family hopefulcan return to [place] [facility] to live out her days but they understand that may not happen and want to prepare with having home hospice in place if it's possible. Family available at home to care for patient in the off hours hospice is not present. In addition, christopher lives with her mother and is there 24/7.   Discussed case with Alma Delia Tapia NP on unit, Dr. Flannery, and Cherri Ontiveros from [employer]. Cherri is going to reach out to [facility] and family for further conversations and see if patient can be approved for services.   Can consider transitioning the patient to comfort care prior to discharge while at [facility] if patient starts to become symptomatic and family agreeable   Palliative care to continue to follow    Total Time Spent___75_ minutes  This includes chart review, patient assessment, discussion and collaboration with interdisciplinary team members, ACP planning  COUNSELING:  Face to face meeting to discuss Advanced Care Planning - Time Spent __20____Minutes.      Thank you for the opportunity to assist with the care of this patient.   [facility] Palliative Medicine Consult Service [phone].  89 year old female found to have ICH, family declines neurosurgical intervention, opting for home hospice support     Problem/Recommendation 1:ICH  Monitor for further changes in mentation  Neurosurgery consulted- no neurosurgical intervention at this time  Family requesting for home hospice   If excessive secretions arise can consider Robinul PRN or atropine SL     Problem/Recommendation 2:AMS/Delirium  Consider trying to avoid/minimize deliriogenic drugs such as benzodiazepines and anticholinergics   Non pharmacologic options for delirium: Frequently orient patient, identify self, maintain consistent staffing and location   Limit stimulation, soft voice, soft lighting, gentle handling  Sleep disturbance support  Provide adequate sensory aides such as hearing aids, glasses, calendar, clock  Soft warm blankets  Bed alarm for safety  Can consider low dose ativan PRN for agitation if symptoms arise    Problem/Recommendation 3: Debility  Assist in ADLS  Maintain safety, fall, aspiration precautions  Set bed alarm, chair alarm for safety and fall prevention  Turn and Position in bed     Problem/Recommendation 4: Palliative Care Encounter  Met with patient at bedside, introduced Palliative Care Team and Services at [facility].   present at time of encounter as well as Grandoni De Santiago  Phone call placed for Christopher (daughter) to be present during our [facility] meeting   Christopher De Santiago myself and the  discussed overall hospital course, diagnosis, their impression and decisions that have been made regarding the patient's ICH and options that were previously discussed between them and prior providers. Christopher explained she had a detailed conversation with the neuro team regarding her diagnosis and prognosis and at this time they decline surgical intervention and want to focus on her quality of life andbeing home with her. Christopher expressed her family in [facility] are all in support with the decision and her as the surrogate to move forward with trying to qualify the patient for home hospice. Also rediscussed code status at this time - confirmed she is DNR/DNI.   Overall, family hopefulcan return to [facility] to live out her days but they understand that may not happen and want to prepare with having home hospice in place if it's possible. Family available at home to care for patient in the off hours hospice is not present. In addition, christopher lives with her mother and is there 24/7.   Discussed case with Alma Delia Tapia NP on unit, RN, Dr. Flannery, and Cherri Ontiveros from [employer]. Cherri is going to reach out to [facility] and family for further conversations and see if patient can be approved for services.   Can consider transitioning the patient to comfort care prior to discharge while at [facility] if patient starts to become symptomatic and family agreeable   Palliative care to continue to follow    Surrogate/HCP/Guardian: Phone#:Christopher Gonzalez (daughter) [phone], secondary Grandson Jonnathan Gonzalez [phone]    Total Time Spent___75_ minutes  This includes chart review, patient assessment, discussion and collaboration with interdisciplinary team members, ACP planning  COUNSELING:  Face to face meeting to discuss Advanced Care Planning - Time Spent __20____Minutes.      Thank you for the opportunity to assist with the care of this patient.   [facility] Palliative Medicine Consult Service [phone].  89 year old female found to have ICH, family declines neurosurgical intervention, opting for home hospice support     Problem/Recommendation 1:ICH  Monitor for further changes in mentation  Neurosurgery consulted- no neurosurgical intervention at this time  Family requesting for home hospice   If excessive secretions arise can consider Robinul PRN or atropine SL     Problem/Recommendation 2:AMS/Delirium  Non pharmacologic options for delirium: Frequently orient patient, identify self, maintain consistent staffing and location   Limit stimulation, soft voice, soft lighting, gentle handling  Sleep disturbance support  Provide adequate sensory aides such as hearing aids, glasses, calendar, clock  Soft warm blankets  Bed alarm for safety  Can consider low dose ativan PRN for agitation if symptoms arise    Problem/Recommendation 3: Debility  Assist in ADLS  Maintain safety, fall, aspiration precautions  Set bed alarm, chair alarm for safety and fall prevention  Turn and Position in bed     Problem/Recommendation 4: Palliative Care Encounter  Met with patient at bedside, introduced Palliative Care Team and Services at [facility].   present at time of encounter as well as Emmett De Santiago  Phone call placed for Natalie (daughter) to be present during our [facility] meeting   Natalie De Santiago myself and the  discussed overall hospital course, diagnosis, their impression and decisions that have been made regarding the patient's ICH and options that were previously discussed between them and prior providers. Natalie explained she had a detailed conversation with the neuro team regarding her diagnosis and prognosis and at this time they decline surgical intervention and want to focus on her quality of life andbeing home with her. Natalie expressed her family in [facility] are all in support with the decision and her as the surrogate to move forward with trying to qualify the patient for home hospice. Also rediscussed code status at this time - confirmed she is DNR/DNI.   Overall, family hopefulcan return to [facility] to live out her days but they understand that may not happen and want to prepare with having home hospice in place if it's possible. Family available at home to care for patient in the off hours hospice is not present. In addition, natalie lives with her mother and is there 24/7.   Discussed case with Alma Delia Tapia NP on unit, RN, Dr. Flannery, and Cherri Ontiveros from [employer]. Cherri is going to reach out to [facility] and family for further conversations and see if patient can be approved for services.   Can consider transitioning the patient to comfort care prior to discharge while at [facility] if patient starts to become symptomatic and family agreeable   Palliative care to continue to follow    Surrogate/HCP/Guardian: Phone#:Natalie Gonzalez (daughter) [phone], secondary Grandson Jonnathan Gonzalez [phone]    Total Time Spent___75_ minutes  This includes chart review, patient assessment, discussion and collaboration with interdisciplinary team members, ACP planning  COUNSELING:  Face to face meeting to discuss Advanced Care Planning - Time Spent __20____Minutes.      Thank you for the opportunity to assist with the care of this patient.   [facility] Palliative Medicine Consult Service [phone].  89 year old female found to have ICH, family declines neurosurgical intervention, opting for home hospice support     Problem/Recommendation 1:ICH  Monitor for further changes in mentation  Neurosurgery consulted- no neurosurgical intervention at this time  Family requesting for home hospice   If excessive secretions arise can consider Robinul PRN or atropine SL     Problem/Recommendation 2:AMS/Delirium  Non pharmacologic options for delirium: Frequently orient patient, identify self, maintain consistent staffing and location   Limit stimulation, soft voice, soft lighting, gentle handling  Sleep disturbance support  Provide adequate sensory aides such as hearing aids, glasses, calendar, clock  Soft warm blankets  Bed alarm for safety  Can consider low dose ativan PRN for agitation if symptoms arise    Problem/Recommendation 3: Debility  Assist in ADLS  Maintain safety, fall, aspiration precautions  Set bed alarm, chair alarm for safety and fall prevention  Turn and Position in bed     Problem/Recommendation 4: Palliative Care Encounter  Met with patient at bedside, introduced Palliative Care Team and Services at [facility].   present at time of encounter as well as Emmett De Santiago  Phone call placed for Natalie (daughter) to be present during our [facility] meeting   Natalie De Santiago myself and the  discussed overall hospital course, diagnosis, their impression and decisions that have been made regarding the patient's ICH and options that were previously discussed between them and prior providers. Natalie explained she had a detailed conversation with the neuro team regarding her diagnosis and prognosis and at this time they decline surgical intervention and want to focus on her quality of life andbeing home with her. Natalie expressed her family in [facility] are all in support with the decision and her as the surrogate to move forward with trying to qualify the patient for home hospice. Also rediscussed code status at this time - confirmed she is DNR/DNI.   Overall, family hopefulcan return to [facility] to live out her days but they understand that may not happen and want to prepare with having home hospice in place if it's possible. Family available at home to care for patient in the off hours hospice is not present. In addition, natalie lives with her mother and is there 24/7.   Discussed case with Alma Delia Tapia NP on unit, RN, Dr. Flannery, and Cherri Ontiveros from [employer]. Cherri is going to reach out to [facility] and family for further conversations and see if patient can be approved for services.   Can consider transitioning the patient to comfort care prior to discharge while at [facility] if patient starts to become symptomatic and family agreeable   Palliative care to continue to follow    Surrogate/HCP/Guardian: Phone#:Natalie Gonzalez (daughter) [phone], secondary Grandson Jonnathan Gonzalez [phone]    Total Time Spent___75_ minutes  This includes chart review, patient assessment, discussion and collaboration with interdisciplinary team members, EXCLUDING ACP planning  COUNSELING:  Face to face meeting to discuss Advanced Care Planning - Time Spent __20____Minutes.      Thank you for the opportunity to assist with the care of this patient.   [facility] Palliative Medicine Consult Service [phone].

## 2023-07-05 NOTE — PROGRESS NOTE ADULT - SUBJECTIVE AND OBJECTIVE BOX
Hospitalist Daily Progress Note    Chief Complaint:  Patient is a 89y old  Female who presents with a chief complaint of ICH (05 Jul 2023 11:35)      SUBJECTIVE / OVERNIGHT EVENTS:  Patient was seen and examined at bedside. Wallisian translated by bedside RN.   No complaints offered.     MEDICATIONS  (STANDING):  ampicillin/sulbactam  IVPB      ampicillin/sulbactam  IVPB 3 Gram(s) IV Intermittent every 12 hours  chlorhexidine 2% Cloths 1 Application(s) Topical <User Schedule>  diltiazem    Tablet 60 milliGRAM(s) Oral every 8 hours  levETIRAcetam  IVPB 250 milliGRAM(s) IV Intermittent <User Schedule>  multivitamin 1 Tablet(s) Oral daily  mupirocin 2% Nasal 1 Application(s) Both Nostrils every 12 hours  senna 2 Tablet(s) Oral at bedtime    MEDICATIONS  (PRN):  hydrALAZINE Injectable 5 milliGRAM(s) IV Push every 4 hours PRN SBP>150  labetalol Injectable 5 milliGRAM(s) IV Push every 4 hours PRN SBP>150        I&O's Summary    04 Jul 2023 07:01  -  05 Jul 2023 07:00  --------------------------------------------------------  IN: 475 mL / OUT: 400 mL / NET: 75 mL        PHYSICAL EXAM:  Vital Signs Last 24 Hrs  T(C): 36.4 (05 Jul 2023 07:40), Max: 37.5 (05 Jul 2023 04:38)  T(F): 97.5 (05 Jul 2023 07:40), Max: 99.5 (05 Jul 2023 04:38)  HR: 58 (05 Jul 2023 07:40) (56 - 181)  BP: 121/60 (05 Jul 2023 07:40) (107/56 - 185/73)  BP(mean): 73 (05 Jul 2023 04:38) (71 - 100)  RR: 18 (05 Jul 2023 07:40) (16 - 21)  SpO2: 96% (05 Jul 2023 07:40) (94% - 99%)    Parameters below as of 05 Jul 2023 07:40  Patient On (Oxygen Delivery Method): room air      Constitutional: NAD, Resting  ENT: Supple, No JVD  Lungs: CTA B/L, Non-labored breathing  Cardio: RRR, S1/S2, No murmur  Abdomen: Soft, Nontender, Nondistended; Bowel sounds present  Extremities: No calf tenderness, No pitting edema  Musculoskeletal:   No joint swelling  Psych: Calm, cooperative affect appropriate  Neuro: Awake and alert, oriented to name, moves extremities  Skin: No rashes; no palpable lesions    LABS:                        11.1   11.38 )-----------( 218      ( 05 Jul 2023 04:53 )             35.5     07-05    138  |  104  |  24.7<H>  ----------------------------<  116<H>  3.9   |  18.0<L>  |  1.03    Ca    8.6      05 Jul 2023 04:53  Phos  3.8     07-05  Mg     2.6     07-05    TPro  7.2  /  Alb  3.2<L>  /  TBili  0.6  /  DBili  x   /  AST  27  /  ALT  17  /  AlkPhos  119  07-05      CARDIAC MARKERS ( 05 Jul 2023 04:53 )  x     / <0.01 ng/mL / x     / x     / x          Urinalysis Basic - ( 05 Jul 2023 04:53 )    Color: x / Appearance: x / SG: x / pH: x  Gluc: 116 mg/dL / Ketone: x  / Bili: x / Urobili: x   Blood: x / Protein: x / Nitrite: x   Leuk Esterase: x / RBC: x / WBC x   Sq Epi: x / Non Sq Epi: x / Bacteria: x        Culture - Urine (collected 03 Jul 2023 13:01)  Source: Clean Catch Clean Catch (Midstream)  Final Report (04 Jul 2023 17:59):    <10,000 CFU/mL Normal Urogenital Sapna    Culture - Blood (collected 03 Jul 2023 10:05)  Source: .Blood Blood-Peripheral  Preliminary Report (04 Jul 2023 22:02):    No growth at 24 hours    Culture - Blood (collected 03 Jul 2023 09:59)  Source: .Blood Blood-Peripheral  Preliminary Report (04 Jul 2023 22:02):    No growth at 24 hours      CAPILLARY BLOOD GLUCOSE            RADIOLOGY REVIEWED

## 2023-07-05 NOTE — CONSULT NOTE ADULT - SUBJECTIVE AND OBJECTIVE BOX
[facility]                                              CARDIOLOGY AT [address], [place] [address]                                             Telephone: [phone]. Fax:[phone]      CARDIOLOGY CONSULTATION NOTE                                                                                             History obtained by: Patient and medical record  Community Cardiologist: None   obtained: No   Reason for Consultation: a-fib    Chief complaint:   Patient is a 89y old  Female who presents with a chief complaint of ICH (04 Jul 2023 16:13)    HPI: 89 y/o Estonian speaking female from [facility] with PMHx of HTN, s/p R hip surgery, s/p b/l eye surgery presented to ED brought in by verónica for increased lethargy. As per verónica, patient was in usual state of health without any acute complaints until Sunday 7/2 when patient became lethargic, not conversant as usual, and had an episode of urinary incontinence. Denies any episodes of incontinence in the past. 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 Neurosurgery consulted, recommended no acute surgical intervention at this time. Ambulates with walker at baseline. Patient currently c/o right hip pain. Grandson reportedly denies recent traumas, accidents, or falls.  (03 Jul 2023 14:56)    CARDIAC TESTING   TTE Echo Complete w/o Contrast w/ Doppler (07.04.23 @ 13:08) >  Summary:   1. Technically difficult study.   2. Endocardial visualization was enhanced with intravenous echo contrast.   3. Normal left ventricular internal cavity size.   4. Hyperdynamic global left ventricular systolic function.   5. Left ventricular ejection fraction, by visual estimation, is 70 to   75%.   6. Spectral Doppler shows impaired relaxation pattern of left   ventricular myocardial filling (Grade I diastolic dysfunction).   7. Mild thickening of the anterior and posterior mitral valve leaflets.   8. Trace mitral valve regurgitation.   9. Sclerotic aortic valve with normal opening.  10. There is no evidence of pericardial effusion.  < end of copied text >    PAST MEDICAL HISTORY  HTN (hypertension)    PAST SURGICAL HISTORY  Status post hip surgery  Status post hip surgery  S/P eye surgery    SOCIAL HISTORY: Denies smoking/alcohol/drugs    FAMILY HISTORY:    Family History of Cardiovascular Disease:  No   Coronary Artery Disease in first degree relative: No   Sudden Cardiac Death in First degree relative: No     HOME MEDICATIONS:   amLODIPine 5 mg oral tablet: 1 orally once a day (03 Jul 2023 15:47)  hydroCHLOROthiazide 12.5 mg oral tablet: 0.5 tab(s) orally once a day (03 Jul 2023 15:47)    CURRENT CARDIAC MEDICATIONS:   amLODIPine   Tablet 5 milliGRAM(s) Oral daily  diltiazem    Tablet 30 milliGRAM(s) Oral every 6 hours  hydrALAZINE Injectable 5 milliGRAM(s) IV Push every 4 hours PRN SBP>150  labetalol Injectable 5 milliGRAM(s) IV Push every 4 hours PRN SBP>150    CURRENT OTHER MEDICATIONS:   levETIRAcetam  IVPB 250 milliGRAM(s) IV Intermittent <User Schedule>  senna 2 Tablet(s) Oral at bedtime  ampicillin/sulbactam  IVPB      ampicillin/sulbactam  IVPB 3 Gram(s) IV Intermittent every 12 hours  chlorhexidine 2% Cloths 1 Application(s) Topical <User Schedule>  multivitamin 1 Tablet(s) Oral daily  mupirocin 2% Nasal 1 Application(s) Both Nostrils every 12 hours, Stop order after: 5 Days    ALLERGIES:   No Known Allergies    REVIEW OF SYMPTOMS:   CONSTITUTIONAL: No fever, no chills, no weight loss, no weight gain, no fatigue   ENMT:  No vertigo; No sinus or throat pain  NECK: No pain or stiffness  CARDIOVASCULAR: No chest pain, no dyspnea, no syncope/presyncope, no fatigue, no palpitations, no dizziness, no Orthopnea, no Paroxsymal nocturnal dyspnea  RESPIRATORY: No shortness of breath, no cough  : No dysuria, no hematuria   GI: no nausea, no diarrhea, no constipation, no abdominal pain  NEURO: No headache, no slurred speech   MUSCULOSKELETAL: No joint pain or swelling; No muscle, back, or extremity pain  PSYCH: No agitation, no anxiety.    ALL OTHER REVIEW OF SYSTEMS ARE NEGATIVE.    VITAL SIGNS:   T(C): 37.5 (07-05-23 @ 04:38), Max: 37.5 (07-05-23 @ 04:38)  T(F): 99.5 (07-05-23 @ 04:38), Max: 99.5 (07-05-23 @ 04:38)  HR: 56 (07-05-23 @ 04:38) (56 - 181)  BP: 107/56 (07-05-23 @ 04:38) (107/56 - 185/73)  RR: 18 (07-05-23 @ 04:38) (16 - 25)  SpO2: 96% (07-05-23 @ 04:38) (94% - 100%)    INTAKE AND OUTPUT:      07-03 @ 07:01  -  07-04 @ 07:00  --------------------------------------------------------  IN: 550 mL / OUT: 650 mL / NET: -100 mL    07-04 @ 07:01  -  07-05 @ 06:29  --------------------------------------------------------  IN: 475 mL / OUT: 400 mL / NET: 75 mL    PHYSICAL EXAM:   Constitutional: Comfortable . No acute distress.   HEENT: Atraumatic and normocephalic , neck is supple . no JVD.   CNS: A&Ox3. No focal deficits.   Respiratory: unlabored. No wheezing, No rhonchi. + crackles at base L>R   Cardiovascular: RRR normal s1 s2. No murmur. No rubs or gallop.  Gastrointestinal: Soft, non-tender. +Bowel sounds.   Extremities: 2+ Peripheral Pulses, No edema  Psychiatric: Calm . no agitation.   Skin: Warm and dry    LABS:   ( 05 Jul 2023 04:53 )  Troponin T  <0.01,  CPK  X    , CKMB  X    , BNP X                     11.1   11.38 )-----------( 218      ( 05 Jul 2023 04:53 )             35.5     07-05    138  |  104  |  24.7<H>  ----------------------------<  116<H>  3.9   |  18.0<L>  |  1.03    Ca    8.6      05 Jul 2023 04:53  Phos  3.8     07-05  Mg     2.6     07-05    TPro  7.2  /  Alb  3.2<L>  /  TBili  0.6  /  DBili  x   /  AST  27  /  ALT  17  /  AlkPhos  119  07-05    Urinalysis Basic - ( 05 Jul 2023 04:53 )  Color: x / Appearance: x / SG: x / pH: x  Gluc: 116 mg/dL / Ketone: x  / Bili: x / Urobili: x   Blood: x / Protein: x / Nitrite: x   Leuk Esterase: x / RBC: x / WBC x   Sq Epi: x / Non Sq Epi: x / Bacteria: x    07-05-23 @ 04:53  CHolesterol: 194,  HDL: 55,  LDL: 119, Triglycerides: 102     Thyroid Stimulating Hormone, Serum: 5.87 uIU/mL (07-05-23 @ 04:53)    ECG: Sinus tachycardia with PVCs and PACs  Prior ECG: No    RADIOLOGY & ADDITIONAL STUDIES:     CT Head No Cont (07.03.23 @ 19:19) >  IMPRESSION:  No significant intervalchange in right parietal and parenchyma   hemorrhage with local subarachnoid extension of blood. No new site of   bleeding or progressive mass effect. No midline shift.  Advanced chronic microvascular ischemic changes.  < end of copied text >    CT Head No Cont (07.03.23 @ 10:46) >  IMPRESSION:  CT HEAD: Acute intraparenchymal hematoma centered in the right posterior   parietal and occipital lobes measuring approximately 3.7 cm AP by 3.3 cm   TR by 5.2 cm cc. There is a large degree of surrounding vasogenic edema.   There is secondary mass effect upon the surrounding parenchyma and atrium   of the right ventricle. No midline shift.   Chronic changes as above.  < end of copied text >      Preliminary evaluation, please await official recommendations     Assessment and recommendations are final when note is signed by the attending.                                      [facility]                                              CARDIOLOGY AT [address], [place] [address]                                             Telephone: [phone]. Fax:[phone]      CARDIOLOGY CONSULTATION NOTE                                                                                             History obtained by: Patient and medical record  Community Cardiologist: None   obtained: No   Reason for Consultation: a-fib    Chief complaint:   Patient is a 89y old  Female who presents with a chief complaint of ICH (04 Jul 2023 16:13)    HPI: 89 y/o [place] speaking female from [facility] with PMHx of HTN, s/p R hip surgery, s/p b/l eye surgery presented to ED brought in by verónica for increased lethargy. As per verónica, patient was in usual state of health without any acute complaints until Sunday 7/2 when patient became lethargic, not conversant as usual, and had an episode of urinary incontinence. Denies any episodes of incontinence in the past. 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 Neurosurgery consulted, recommended no acute surgical intervention at this time. Ambulates with walker at baseline. Patient currently c/o right hip pain. Grandson reportedly denies recent traumas, accidents, or falls.  (03 Jul 2023 14:56)    CARDIAC TESTING   TTE Echo Complete w/o Contrast w/ Doppler (07.04.23 @ 13:08) >  Summary:   1. Technically difficult study.   2. Endocardial visualization was enhanced with intravenous echo contrast.   3. Normal left ventricular internal cavity size.   4. Hyperdynamic global left ventricular systolic function.   5. Left ventricular ejection fraction, by visual estimation, is 70 to   75%.   6. Spectral Doppler shows impaired relaxation pattern of left   ventricular myocardial filling (Grade I diastolic dysfunction).   7. Mild thickening of the anterior and posterior mitral valve leaflets.   8. Trace mitral valve regurgitation.   9. Sclerotic aortic valve with normal opening.  10. There is no evidence of pericardial effusion.  < end of copied text >    PAST MEDICAL HISTORY  HTN (hypertension)    PAST SURGICAL HISTORY  Status post hip surgery  Status post hip surgery  S/P eye surgery    SOCIAL HISTORY: Denies smoking/alcohol/drugs    FAMILY HISTORY:    Family History of Cardiovascular Disease:  No   Coronary Artery Disease in first degree relative: No   Sudden Cardiac Death in First degree relative: No     HOME MEDICATIONS:   amLODIPine 5 mg oral tablet: 1 orally once a day (03 Jul 2023 15:47)  hydroCHLOROthiazide 12.5 mg oral tablet: 0.5 tab(s) orally once a day (03 Jul 2023 15:47)    CURRENT CARDIAC MEDICATIONS:   amLODIPine   Tablet 5 milliGRAM(s) Oral daily  diltiazem    Tablet 30 milliGRAM(s) Oral every 6 hours  hydrALAZINE Injectable 5 milliGRAM(s) IV Push every 4 hours PRN SBP>150  labetalol Injectable 5 milliGRAM(s) IV Push every 4 hours PRN SBP>150    CURRENT OTHER MEDICATIONS:   levETIRAcetam  IVPB 250 milliGRAM(s) IV Intermittent <User Schedule>  senna 2 Tablet(s) Oral at bedtime  ampicillin/sulbactam  IVPB      ampicillin/sulbactam  IVPB 3 Gram(s) IV Intermittent every 12 hours  chlorhexidine 2% Cloths 1 Application(s) Topical <User Schedule>  multivitamin 1 Tablet(s) Oral daily  mupirocin 2% Nasal 1 Application(s) Both Nostrils every 12 hours, Stop order after: 5 Days    ALLERGIES:   No Known Allergies    REVIEW OF SYMPTOMS:   CONSTITUTIONAL: No fever, no chills, no weight loss, no weight gain, no fatigue   ENMT:  No vertigo; No sinus or throat pain  NECK: No pain or stiffness  CARDIOVASCULAR: No chest pain, no dyspnea, no syncope/presyncope, no fatigue, no palpitations, no dizziness, no Orthopnea, no Paroxsymal nocturnal dyspnea  RESPIRATORY: No shortness of breath, no cough  : No dysuria, no hematuria   GI: no nausea, no diarrhea, no constipation, no abdominal pain  NEURO: No headache, no slurred speech   MUSCULOSKELETAL: No joint pain or swelling; No muscle, back, or extremity pain  PSYCH: No agitation, no anxiety.    ALL OTHER REVIEW OF SYSTEMS ARE NEGATIVE.    VITAL SIGNS:   T(C): 37.5 (07-05-23 @ 04:38), Max: 37.5 (07-05-23 @ 04:38)  T(F): 99.5 (07-05-23 @ 04:38), Max: 99.5 (07-05-23 @ 04:38)  HR: 56 (07-05-23 @ 04:38) (56 - 181)  BP: 107/56 (07-05-23 @ 04:38) (107/56 - 185/73)  RR: 18 (07-05-23 @ 04:38) (16 - 25)  SpO2: 96% (07-05-23 @ 04:38) (94% - 100%)    INTAKE AND OUTPUT:      07-03 @ 07:01  -  07-04 @ 07:00  --------------------------------------------------------  IN: 550 mL / OUT: 650 mL / NET: -100 mL    07-04 @ 07:01  -  07-05 @ 06:29  --------------------------------------------------------  IN: 475 mL / OUT: 400 mL / NET: 75 mL    PHYSICAL EXAM:   Constitutional: Comfortable . No acute distress.   HEENT: Atraumatic and normocephalic , neck is supple . no JVD.   CNS: A&Ox3. No focal deficits.   Respiratory: unlabored. No wheezing, No rhonchi. + crackles at base L>R   Cardiovascular: RRR normal s1 s2. No murmur. No rubs or gallop.  Gastrointestinal: Soft, non-tender. +Bowel sounds.   Extremities: 2+ Peripheral Pulses, No edema  Psychiatric: Calm . no agitation.   Skin: Warm and dry    LABS:   ( 05 Jul 2023 04:53 )  Troponin T  <0.01,  CPK  X    , CKMB  X    , BNP X                     11.1   11.38 )-----------( 218      ( 05 Jul 2023 04:53 )             35.5     07-05    138  |  104  |  24.7<H>  ----------------------------<  116<H>  3.9   |  18.0<L>  |  1.03    Ca    8.6      05 Jul 2023 04:53  Phos  3.8     07-05  Mg     2.6     07-05    TPro  7.2  /  Alb  3.2<L>  /  TBili  0.6  /  DBili  x   /  AST  27  /  ALT  17  /  AlkPhos  119  07-05    Urinalysis Basic - ( 05 Jul 2023 04:53 )  Color: x / Appearance: x / SG: x / pH: x  Gluc: 116 mg/dL / Ketone: x  / Bili: x / Urobili: x   Blood: x / Protein: x / Nitrite: x   Leuk Esterase: x / RBC: x / WBC x   Sq Epi: x / Non Sq Epi: x / Bacteria: x    07-05-23 @ 04:53  CHolesterol: 194,  HDL: 55,  LDL: 119, Triglycerides: 102     Thyroid Stimulating Hormone, Serum: 5.87 uIU/mL (07-05-23 @ 04:53)    ECG: Sinus tachycardia with PVCs and PACs  Prior ECG: No    RADIOLOGY & ADDITIONAL STUDIES:     CT Head No Cont (07.03.23 @ 19:19) >  IMPRESSION:  No significant intervalchange in right parietal and parenchyma   hemorrhage with local subarachnoid extension of blood. No new site of   bleeding or progressive mass effect. No midline shift.  Advanced chronic microvascular ischemic changes.  < end of copied text >    CT Head No Cont (07.03.23 @ 10:46) >  IMPRESSION:  CT HEAD: Acute intraparenchymal hematoma centered in the right posterior   parietal and occipital lobes measuring approximately 3.7 cm AP by 3.3 cm   TR by 5.2 cm cc. There is a large degree of surrounding vasogenic edema.   There is secondary mass effect upon the surrounding parenchyma and atrium   of the right ventricle. No midline shift.   Chronic changes as above.  < end of copied text >    < from: TTE Echo Complete w/o Contrast w/ Doppler (07.04.23 @ 13:08) >  Summary:   1. Technically difficult study.   2. Endocardial visualization was enhanced with intravenous echo contrast.   3. Normal left ventricular internal cavity size.   4. Hyperdynamic global left ventricular systolic function.   5. Left ventricular ejection fraction, by visual estimation, is 70 to   75%.   6. Spectral Doppler shows impaired relaxation pattern of left   ventricular myocardial filling (Grade I diastolic dysfunction).   7. Mild thickening of the anterior and posterior mitral valve leaflets.   8. Trace mitral valve regurgitation.   9. Sclerotic aortic valve with normal opening.  10. There is no evidence of pericardial effusion.    MD Della Electronically signed on 7/4/2023 at 4:57:50 PM      < end of copied text >    Preliminary evaluation, please await official recommendations     Assessment and recommendations are final when note is signed by the attending.

## 2023-07-05 NOTE — PROGRESS NOTE ADULT - ASSESSMENT
89 y/o Hebrew speaking female from [facility], with PMHx of HTN, s/p R hip surgery couple of years ago , s/p b/l eye surgery presented to ED brought in by grandson for increased lethargy. As per grandson, patient was in usual state of health without any acute complaints until Sunday 7/2/23 when patient became lethargic, not conversant as usual, and had an episode of urinary incontinence. Denied any episodes of incontinence in the past. 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 Neurosurgery consulted, recommended no acute surgical intervention at this time.  Stroke neurology consulted. At time of presentation daughter at bedside stated that her mother did fall a little while ago back at home in [facility]. Stated that patient needed some assistance with showering and getting changed at home and used a cane to walk.  ext movement movement limited due to pain. no facial droop noted. started on po feeds. patient noted with low grade fever and mild leukocytosis. downgraded to medicine 7/4/23       #Encephalopathy secondary to ICH  Related to spontaneous vs HTN  cth R posterior parieto-occipital IPH with vasogenic edema and mass effect  repeat cth stable   Prior hospitalist discussed with Neurology - No need for further CT  MRI was initially requested per family but now will defer  neuro checks q 4   monitor bp   c/w Keppra for seizure prophylaxis  pt/ot/speech eval   Family has decided to pursue  hospice    #Leukocytosis  With low grade fever   UA neg 7/3   CXR appears neg  Was started on unasyn   Will continue for now to complete 5 day course  Blood cx with ngtd    #left ovary fibrous lesion   incidental finding noted on ct   follow up as op for further w/u    dvt ppx : scds     goc: dnr/dni family agrees to home hospice    DC plan with home hospice    >plan of care  discussed with michael in detail. unabel to discuss with pateint due to mentation. discussed with neuro icu / neuro team

## 2023-07-05 NOTE — CONSULT NOTE ADULT - PROBLEM SELECTOR RECOMMENDATION 2
Well controlled  Continue home meds with strict parameters  Monitor BP  DASH diet    Further recommendations base on above findings  Case discussed with Dr Corrales

## 2023-07-05 NOTE — PROGRESS NOTE ADULT - NS ATTEND AMEND GEN_ALL_CORE FT
see my addendum to initial consult
Seen and examined with the ACP team. Plan discussed with ACPs.    I agree with assessment and plan  as written with modifications made above.    Plan for home with hospice services     Thank you for allowing me to participate in the care of your patient    Leeroy Pretty MD, PhD   [id number]

## 2023-07-05 NOTE — PROGRESS NOTE ADULT - SUBJECTIVE AND OBJECTIVE BOX
Noted w/ Tachy 180-190's this AM    InPt Meds:  ampicillin/sulbactam  IVPB      ampicillin/sulbactam  IVPB 3 Gram(s) IV Intermittent every 12 hours  chlorhexidine 2% Cloths 1 Application(s) Topical <User Schedule>  diltiazem    Tablet 60 milliGRAM(s) Oral every 8 hours  hydrALAZINE Injectable 5 milliGRAM(s) IV Push every 4 hours PRN  labetalol Injectable 5 milliGRAM(s) IV Push every 4 hours PRN  levETIRAcetam  IVPB 250 milliGRAM(s) IV Intermittent <User Schedule>  multivitamin 1 Tablet(s) Oral daily  mupirocin 2% Nasal 1 Application(s) Both Nostrils every 12 hours  senna 2 Tablet(s) Oral at bedtime    VS:  T(C): 36.4 (07-05-23 @ 07:40), Max: 37.5 (07-05-23 @ 04:38)  HR: 58 (07-05-23 @ 07:40) (56 - 181)  BP: 121/60 (07-05-23 @ 07:40) (107/56 - 185/73)  RR: 18 (07-05-23 @ 07:40) (16 - 21)  SpO2: 96% (07-05-23 @ 07:40) (94% - 100%)    Exam:    A/O x 1 / Confused   No facial  Following simple commands  BARRERA x 4, + sen intact    Labs:  CTH 7/3: No significant intervalchange in right parietal and parenchyma hemorrhage with local subarachnoid extension of blood. No new site of bleeding or progressive mass effect. No midline shift. Advanced chronic microvascular ischemic changes  CT 7/3 AM: Acute intraparenchymal hematoma centered in the right posterior parietal and occipital lobes measuring approximately 3.7 cm AP by 3.3 cm TR by 5.2 cm cc. There is a large degree of surrounding vasogenic edema. There is secondary mass effect upon the surrounding parenchyma and atrium of the right ventricle. No midline shift.   Chronic changes as above                        11.1   11.38 )-----------( 218      ( 05 Jul 2023 04:53 )             35.5       A/P: 89F PMH HTN, S/p Eye Surgery P/W Acute IP Hematoma Rt posterior parietal and occipital lobes w/ edema. ICH 2, MRS 1 C/W MSSA and New Onset Afib.    -Cont Neuro cks q 4  -SBP < 150  -MRI +/- Brain pending  -Keppra 250, [place] preca  -Neuro f/up  -DNR/I  -PT/OOB/Fall prev    No acute Nsx intervention, can f/up in clinic upon d/c, recall w/ questions    Diss w/ attending

## 2023-07-05 NOTE — CONSULT NOTE ADULT - PROBLEM SELECTOR RECOMMENDATION 9
Cardiac arrhythmia vs a-fib  Pt is  89 y/o female presented to ED for increased lethargy. 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 Neurosurgery consulted, recommended no acute surgical intervention at this time.   Pt had episode of tachycardia with was treated with  5mg IV lopressor with poor effect and 10mg IV Cardizem with improvement in HR  Pt with elevated TSH (5.87)  Pt was started on Cardizem 60 mg q 6  - Echo from 07/04/23 shows EF of 70 to 75%. Grade I diastolic dysfunction). Trace mitral valve regurgitation.  - Last EKG shows Sinus tachycardia with PVCs and PACs  Trop x 1 negative  Telemonitor  HR controlled  May use Metoprolol 5 mg IVP for sustained HR >120   [id number] VASc:  4 points (age +2, F and HTN) Stroke risk was 4.8% and 6.7% risk of stroke/TIA/systemic embolism.  Trend CE. Trend trops x 3 q6. Serial EKGs  Replace all electrolytes. Maintain K+~4 and mag~2  A1C, lipid panel fasting, TFTs, sed rate to ro comorbidities

## 2023-07-05 NOTE — PROGRESS NOTE ADULT - SUBJECTIVE AND OBJECTIVE BOX
Preliminary note, offical recommendations pending attending review/signature   [facility] Stroke Team  Progress Note       HPI:  89 y/o French speaking female from [facility] with PMHx of HTN, s/p R hip surgery, s/p b/l eye surgery presented to ED brought in by grandson for increased lethargy. As per grandson, patient was in usual state of health without any acute complaints until Sunday 7/2/23 when patient became lethargic, not conversant as usual, and had an episode of urinary incontinence. Denied any episodes of incontinence in the past. 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 Neurosurgery consulted, recommended no acute surgical intervention at this time.  Stroke neurology consulted. At time of presentation daughter at bedside stated that her mother did fall a little while ago back at home in [facility]. Stated that patient needed some assistance with showering and getting changed at home and used a cane to walk.     SUBJECTIVE: Episode of tachycardia of 180-190 this morning. No new neurologic complaints.  ROS reported negative unless otherwise noted.    amLODIPine   Tablet 5 milliGRAM(s) Oral daily  ampicillin/sulbactam  IVPB      ampicillin/sulbactam  IVPB 3 Gram(s) IV Intermittent every 12 hours  chlorhexidine 2% Cloths 1 Application(s) Topical <User Schedule>  diltiazem    Tablet 30 milliGRAM(s) Oral every 6 hours  hydrALAZINE Injectable 5 milliGRAM(s) IV Push every 4 hours PRN  labetalol Injectable 5 milliGRAM(s) IV Push every 4 hours PRN  levETIRAcetam  IVPB 250 milliGRAM(s) IV Intermittent <User Schedule>  multivitamin 1 Tablet(s) Oral daily  mupirocin 2% Nasal 1 Application(s) Both Nostrils every 12 hours  senna 2 Tablet(s) Oral at bedtime      PHYSICAL EXAM:   Vital Signs Last 24 Hrs  T(C): 36.4 (05 Jul 2023 07:40), Max: 37.5 (05 Jul 2023 04:38)  T(F): 97.5 (05 Jul 2023 07:40), Max: 99.5 (05 Jul 2023 04:38)  HR: 58 (05 Jul 2023 07:40) (56 - 181)  BP: 121/60 (05 Jul 2023 07:40) (107/56 - 185/73)  BP(mean): 73 (05 Jul 2023 04:38) (71 - 100)  RR: 18 (05 Jul 2023 07:40) (16 - 25)  SpO2: 96% (05 Jul 2023 07:40) (94% - 100%)    Parameters below as of 05 Jul 2023 07:40  Patient On (Oxygen Delivery Method): room air      PENDING  General: NAD. Seen in bed with daughter at bedside.    Detailed Neurologic Exam:    Mental status: The patient is awake and oriented to self and place, but unable to state year or month. The patient is unable to name objects, follow commands, repeat sentences.    Cranial nerves: Unable to fully participate in eye exam due to frequent closing of eyes and pain from light. Hx of bilateral cataract surgery. No visual neglect. There is no ptosis. Facial sensation is intact. Facial musculature is symmetric.     Motor: There is normal bulk and tone.  There is no tremor.  RUE and LUE- move spontaneously antigravity   RLE and LLE- withdraw to pain and move within bed    Sensation: Localizes to pain in all 4 extremities.    Cerebellar: unable to assess    Gait : deferred      LABS:                        11.1   11.38 )-----------( 218      ( 05 Jul 2023 04:53 )             35.5    07-05    138  |  104  |  24.7<H>  ----------------------------<  116<H>  3.9   |  18.0<L>  |  1.03    Ca    8.6      05 Jul 2023 04:53  Phos  3.8     07-05  Mg     2.6     07-05    TPro  7.2  /  Alb  3.2<L>  /  TBili  0.6  /  DBili  x   /  AST  27  /  ALT  17  /  AlkPhos  119  07-05    RADIOLOGY & ADDITIONAL STUDIES (independently reviewed unless otherwise noted):  CT Head No Cont (07.03.23 @ 19:19)   IMPRESSION:  No significant intervalchange in right parietal and parenchyma   hemorrhage with local subarachnoid extension of blood. No new site of   bleeding or progressive mass effect. No midline shift.    Advanced chronic microvascular ischemic changes.    CT Head No Cont (07.03.23 @ 19:19)   IMPRESSION:  No significant interval change in right parietal and parenchymal   hemorrhage with local subarachnoid extension of blood. No new site of   bleeding or progressive mass effect. No midline shift.  Advanced chronic microvascular ischemic changes.        Preliminary note, offical recommendations pending attending review/signature   [facility] Stroke Team  Progress Note      number [id number]  HPI:  89 y/o Dutch speaking female from [facility] with PMHx of HTN, s/p R hip surgery, s/p b/l eye surgery presented to ED brought in by grandson for increased lethargy. As per grandson, patient was in usual state of health without any acute complaints until Sunday 7/2/23 when patient became lethargic, not conversant as usual, and had an episode of urinary incontinence. Denied any episodes of incontinence in the past. 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 Neurosurgery consulted, recommended no acute surgical intervention at this time.  Stroke neurology consulted. At time of presentation daughter at bedside stated that her mother did fall a little while ago back at home in [facility]. Stated that patient needed some assistance with showering and getting changed at home and used a cane to walk.     SUBJECTIVE: Episode of tachycardia of 180-190 this morning. No new neurologic complaints.  ROS reported negative unless otherwise noted.     amLODIPine   Tablet 5 milliGRAM(s) Oral daily  ampicillin/sulbactam  IVPB      ampicillin/sulbactam  IVPB 3 Gram(s) IV Intermittent every 12 hours  chlorhexidine 2% Cloths 1 Application(s) Topical <User Schedule>  diltiazem    Tablet 30 milliGRAM(s) Oral every 6 hours  hydrALAZINE Injectable 5 milliGRAM(s) IV Push every 4 hours PRN  labetalol Injectable 5 milliGRAM(s) IV Push every 4 hours PRN  levETIRAcetam  IVPB 250 milliGRAM(s) IV Intermittent <User Schedule>  multivitamin 1 Tablet(s) Oral daily  mupirocin 2% Nasal 1 Application(s) Both Nostrils every 12 hours  senna 2 Tablet(s) Oral at bedtime      PHYSICAL EXAM:   Vital Signs Last 24 Hrs  T(C): 36.4 (05 Jul 2023 07:40), Max: 37.5 (05 Jul 2023 04:38)  T(F): 97.5 (05 Jul 2023 07:40), Max: 99.5 (05 Jul 2023 04:38)  HR: 58 (05 Jul 2023 07:40) (56 - 181)  BP: 121/60 (05 Jul 2023 07:40) (107/56 - 185/73)  BP(mean): 73 (05 Jul 2023 04:38) (71 - 100)  RR: 18 (05 Jul 2023 07:40) (16 - 25)  SpO2: 96% (05 Jul 2023 07:40) (94% - 100%)    Parameters below as of 05 Jul 2023 07:40  Patient On (Oxygen Delivery Method): room air        General: NAD. Seen in bed with grandson at bedside.    Detailed Neurologic Exam:    Mental status: No verbal output. Moans to pain. The patient is unable to name objects, follow commands, or repeat sentences.    Cranial nerves: Unable to fully participate in eye exam due to frequent closing of eyes and pain from light. Hx of bilateral cataract surgery. No visual neglect. There is no ptosis. Facial sensation is intact. Facial musculature is symmetric.     Motor: There is normal bulk and tone.  There is no tremor.  RUE and LUE- move spontaneously antigravity   RLE and LLE- moves spontaneously within bed.    Sensation: Localizes to pain in all 4 extremities. Moans when lower extremities are touched.     Cerebellar: unable to assess    Gait : deferred      LABS:                        11.1   11.38 )-----------( 218      ( 05 Jul 2023 04:53 )             35.5    07-05    138  |  104  |  24.7<H>  ----------------------------<  116<H>  3.9   |  18.0<L>  |  1.03    Ca    8.6      05 Jul 2023 04:53  Phos  3.8     07-05  Mg     2.6     07-05    TPro  7.2  /  Alb  3.2<L>  /  TBili  0.6  /  DBili  x   /  AST  27  /  ALT  17  /  AlkPhos  119  07-05    RADIOLOGY & ADDITIONAL STUDIES (independently reviewed unless otherwise noted):  CT Head No Cont (07.03.23 @ 19:19)   IMPRESSION:  No significant intervalchange in right parietal and parenchyma   hemorrhage with local subarachnoid extension of blood. No new site of   bleeding or progressive mass effect. No midline shift.    Advanced chronic microvascular ischemic changes.    CT Head No Cont (07.03.23 @ 19:19)   IMPRESSION:  No significant interval change in right parietal and parenchymal   hemorrhage with local subarachnoid extension of blood. No new site of   bleeding or progressive mass effect. No midline shift.  Advanced chronic microvascular ischemic changes.        Preliminary note, offical recommendations pending attending review/signature   [facility] Stroke Team  Progress Note      number [id number]  HPI:  89 y/o Czech speaking female from [facility] with PMHx of HTN, s/p R hip surgery, s/p b/l eye surgery presented to ED brought in by grandson for increased lethargy. As per grandson, patient was in usual state of health without any acute complaints until Sunday 7/2/23 when patient became lethargic, not conversant as usual, and had an episode of urinary incontinence. Denied any episodes of incontinence in the past. 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 Neurosurgery consulted, recommended no acute surgical intervention at this time.  Stroke neurology consulted. At time of presentation daughter at bedside stated that her mother did fall a little while ago back at home in [facility]. Stated that patient needed some assistance with showering and getting changed at home and used a cane to walk.     SUBJECTIVE: Episode of tachycardia of 180-190 this morning. No new neurologic complaints.  ROS reported negative unless otherwise noted.     amLODIPine   Tablet 5 milliGRAM(s) Oral daily  ampicillin/sulbactam  IVPB      ampicillin/sulbactam  IVPB 3 Gram(s) IV Intermittent every 12 hours  chlorhexidine 2% Cloths 1 Application(s) Topical <User Schedule>  diltiazem    Tablet 30 milliGRAM(s) Oral every 6 hours  hydrALAZINE Injectable 5 milliGRAM(s) IV Push every 4 hours PRN  labetalol Injectable 5 milliGRAM(s) IV Push every 4 hours PRN  levETIRAcetam  IVPB 250 milliGRAM(s) IV Intermittent <User Schedule>  multivitamin 1 Tablet(s) Oral daily  mupirocin 2% Nasal 1 Application(s) Both Nostrils every 12 hours  senna 2 Tablet(s) Oral at bedtime      PHYSICAL EXAM:   Vital Signs Last 24 Hrs  T(C): 36.4 (05 Jul 2023 07:40), Max: 37.5 (05 Jul 2023 04:38)  T(F): 97.5 (05 Jul 2023 07:40), Max: 99.5 (05 Jul 2023 04:38)  HR: 58 (05 Jul 2023 07:40) (56 - 181)  BP: 121/60 (05 Jul 2023 07:40) (107/56 - 185/73)  BP(mean): 73 (05 Jul 2023 04:38) (71 - 100)  RR: 18 (05 Jul 2023 07:40) (16 - 25)  SpO2: 96% (05 Jul 2023 07:40) (94% - 100%)    Parameters below as of 05 Jul 2023 07:40  Patient On (Oxygen Delivery Method): room air        General: NAD. Seen in bed with grandson at bedside.    Detailed Neurologic Exam:    Mental status: No verbal output. Moans to pain. The patient is unable to name objects, follow commands, or repeat sentences.    Cranial nerves: Unable to fully participate in eye exam due to frequent closing of eyes and pain from light. Hx of bilateral cataract surgery. No visual neglect. There is no ptosis. Facial musculature is symmetric.     Motor:   RUE and LUE- move spontaneously antigravity   RLE and LLE- moves spontaneously within bed but limited due to pain.     Sensation: Moans when lower extremities are touched.     Cerebellar: unable to assess    Gait : deferred      LABS:                        11.1   11.38 )-----------( 218      ( 05 Jul 2023 04:53 )             35.5    07-05    138  |  104  |  24.7<H>  ----------------------------<  116<H>  3.9   |  18.0<L>  |  1.03    Ca    8.6      05 Jul 2023 04:53  Phos  3.8     07-05  Mg     2.6     07-05    TPro  7.2  /  Alb  3.2<L>  /  TBili  0.6  /  DBili  x   /  AST  27  /  ALT  17  /  AlkPhos  119  07-05    RADIOLOGY & ADDITIONAL STUDIES (independently reviewed unless otherwise noted):  CT Head No Cont (07.03.23 @ 19:19)   IMPRESSION:  No significant intervalchange in right parietal and parenchyma   hemorrhage with local subarachnoid extension of blood. No new site of   bleeding or progressive mass effect. No midline shift.    Advanced chronic microvascular ischemic changes.    CT Head No Cont (07.03.23 @ 19:19)   IMPRESSION:  No significant interval change in right parietal and parenchymal   hemorrhage with local subarachnoid extension of blood. No new site of   bleeding or progressive mass effect. No midline shift.  Advanced chronic microvascular ischemic changes.        [facility] Stroke Team  Progress Note      number [id number]  HPI:  89 y/o [place] Zealander speaking female from [facility] with PMHx of HTN, s/p R hip surgery, s/p b/l eye surgery presented to ED brought in by grandson for increased lethargy. As per grandson, patient was in usual state of health without any acute complaints until Sunday 7/2/23 when patient became lethargic, not conversant as usual, and had an episode of urinary incontinence. Denied any episodes of incontinence in the past. 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 Neurosurgery consulted, recommended no acute surgical intervention at this time.  Stroke neurology consulted. At time of presentation daughter at bedside stated that her mother did fall a little while ago back at home in [facility]. Stated that patient needed some assistance with showering and getting changed at home and used a cane to walk.     SUBJECTIVE: Episode of tachycardia of 180-190 this morning. No new neurologic complaints.  ROS reported negative unless otherwise noted.     amLODIPine   Tablet 5 milliGRAM(s) Oral daily  ampicillin/sulbactam  IVPB      ampicillin/sulbactam  IVPB 3 Gram(s) IV Intermittent every 12 hours  chlorhexidine 2% Cloths 1 Application(s) Topical <User Schedule>  diltiazem    Tablet 30 milliGRAM(s) Oral every 6 hours  hydrALAZINE Injectable 5 milliGRAM(s) IV Push every 4 hours PRN  labetalol Injectable 5 milliGRAM(s) IV Push every 4 hours PRN  levETIRAcetam  IVPB 250 milliGRAM(s) IV Intermittent <User Schedule>  multivitamin 1 Tablet(s) Oral daily  mupirocin 2% Nasal 1 Application(s) Both Nostrils every 12 hours  senna 2 Tablet(s) Oral at bedtime      PHYSICAL EXAM:   Vital Signs Last 24 Hrs  T(C): 36.4 (05 Jul 2023 07:40), Max: 37.5 (05 Jul 2023 04:38)  T(F): 97.5 (05 Jul 2023 07:40), Max: 99.5 (05 Jul 2023 04:38)  HR: 58 (05 Jul 2023 07:40) (56 - 181)  BP: 121/60 (05 Jul 2023 07:40) (107/56 - 185/73)  BP(mean): 73 (05 Jul 2023 04:38) (71 - 100)  RR: 18 (05 Jul 2023 07:40) (16 - 25)  SpO2: 96% (05 Jul 2023 07:40) (94% - 100%)    Parameters below as of 05 Jul 2023 07:40  Patient On (Oxygen Delivery Method): room air        General: NAD. Seen in bed with grandson at bedside.    Detailed Neurologic Exam:    Mental status: No verbal output. Moans to pain. The patient is unable to name objects, follow commands, or repeat sentences.    Cranial nerves: Unable to fully participate in eye exam due to frequent closing of eyes and pain from light. Hx of bilateral cataract surgery. No visual neglect. There is no ptosis. Facial musculature is symmetric.     Motor:   RUE and LUE- move spontaneously antigravity   RLE and LLE- moves spontaneously within bed but limited due to pain.     Sensation: Moans when lower extremities are touched.     Cerebellar: unable to assess    Gait : deferred      LABS:                        11.1   11.38 )-----------( 218      ( 05 Jul 2023 04:53 )             35.5    07-05    138  |  104  |  24.7<H>  ----------------------------<  116<H>  3.9   |  18.0<L>  |  1.03    Ca    8.6      05 Jul 2023 04:53  Phos  3.8     07-05  Mg     2.6     07-05    TPro  7.2  /  Alb  3.2<L>  /  TBili  0.6  /  DBili  x   /  AST  27  /  ALT  17  /  AlkPhos  119  07-05    RADIOLOGY & ADDITIONAL STUDIES (independently reviewed unless otherwise noted):  CT Head No Cont (07.03.23 @ 19:19)   IMPRESSION:  No significant intervalchange in right parietal and parenchyma   hemorrhage with local subarachnoid extension of blood. No new site of   bleeding or progressive mass effect. No midline shift.    Advanced chronic microvascular ischemic changes.    CT Head No Cont (07.03.23 @ 19:19)   IMPRESSION:  No significant interval change in right parietal and parenchymal   hemorrhage with local subarachnoid extension of blood. No new site of   bleeding or progressive mass effect. No midline shift.  Advanced chronic microvascular ischemic changes.

## 2023-07-05 NOTE — PROGRESS NOTE ADULT - ASSESSMENT
INCOMPLETE  ASSESSMENT: 89 y/o Belgian speaking female from [facility] with PMHx of HTN, s/p R hip surgery, s/p b/l eye surgery presented to ED brought in by grandson for increased lethargy. As per grandson, patient was in usual state of health without any acute complaints until Sunday 7/2/23 when patient became lethargic, not conversant as usual, and had an episode of urinary incontinence. Denied any episodes of incontinence in the past. Daughter at bedside stated that she fell a little while ago while back home in [facility]. 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     NEURO: Repeat head CT was done on 7/3/23 showing no change in right parietal and parenchymal hemorrhage w/ local SAH extension. Continue close monitoring for neurologic deterioration , stroke neuro checks per neuro ICU team ,SBP goal 100-150 per neuro sx recommendations , Neurosurgery consulted, recommended no acute surgical intervention at this time. titrate statin to LDL goal less than 70, MRI Brain w/out per family wishes. Physical therapy/OT/Speech eval/treatment. She has mild confusion, question baseline mental status    ANTITHROMBOTIC THERAPY: Not permitted at this time due to parieto-occipital intraparenchymal hemorrhage and no neurological indication at this time.     PULMONARY:  protecting airway, saturating well     CARDIOVASCULAR: TTE pending, cardiac monitoring                             GASTROINTESTINAL:  dysphagia screen- pass, advance as tolerated     Diet: Puree    RENAL: BUN/Cr 24.7/1.03. monitor urine output, maintain adequate hydration       Na Goal:  135-145    HEMATOLOGY: H/H 11.1/35.5, monitor for signs and symptoms of further anemia. Platelets 218. patient should have all age and risk appropriate malignancy screenings with PCP or sooner if clinically suspected      DVT ppx: Heparin s.c [] LMWH [] - SCD at this time. Chemoppx on hold due to IPH    ID: afebrile, leukocytosis 11.38, monitor for si/sx of infection     OTHER: condition and plan of care d/w patient, and daughter at bedside using  IPAD, questions and concerns addressed. Suggest reevaluate goals of care at this time.    DISPOSITION: Rehab or home depending on PT eval once stable and workup is complete      CORE MEASURES:        Admission NIHSS:      Tenecteplase : [] YES [x] NO      LDL/HDL/A1C: pending     Depression Screen- if depression hx and/or present -p     Statin Therapy: as noted     Dysphagia Screen: [] PASS [x] FAIL     Smoking [] YES [x] NO      Afib [] YES [x] NO     Stroke Education [] YES [] NO- ordered and pending    Obtain screening lower extremity venous ultrasound in patients who meet 1 or more of the following criteria as patient is high risk for DVT/PE on admission:   [] History of DVT/PE  []Hypercoagulable states (Factor V Leiden, Cancer, OCP, etc. )  []Prolonged immobility (hemiplegia/hemiparesis/post operative or any other extended immobilization)  [] Transferred from outside facility (Rehab or Long term care)  [] Age </= to 50 INCOMPLETE  ASSESSMENT: 89 y/o Pakistani speaking female from [facility] with PMHx of HTN, s/p R hip surgery, s/p b/l eye surgery presented to ED brought in by grandson for increased lethargy. As per grandson, patient was in usual state of health without any acute complaints until Sunday 7/2/23 when patient became lethargic, not conversant as usual, and had an episode of urinary incontinence. Denied any episodes of incontinence in the past. Daughter at bedside stated that she fell a little while ago while back home in [facility]. 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  Etiology of hemorrhage could be traumatic vs. due to hypertension.     NEURO: Repeat head CT was done on 7/3/23 showing no change in right parietal and parenchymal hemorrhage w/ local SAH extension. Continue close monitoring for neurologic deterioration , stroke neuro checks per neuro ICU team ,SBP goal 100-150 per neuro sx recommendations , Neurosurgery consulted, recommended no acute surgical intervention at this time. titrate statin to LDL goal less than 70, MRI Brain w/out per family wishes. Physical therapy/OT/Speech eval/treatment. She has mild confusion, question baseline mental status    ANTITHROMBOTIC THERAPY: Not permitted at this time due to parieto-occipital intraparenchymal hemorrhage and no neurological indication at this time.     PULMONARY:  protecting airway, saturating well     CARDIOVASCULAR: TTE pending, cardiac monitoring                             GASTROINTESTINAL:  dysphagia screen- pass, advance as tolerated     Diet: Puree    RENAL: BUN/Cr 24.7/1.03. monitor urine output, maintain adequate hydration       Na Goal:  135-145    HEMATOLOGY: H/H 11.1/35.5, monitor for signs and symptoms of further anemia. Platelets 218. patient should have all age and risk appropriate malignancy screenings with PCP or sooner if clinically suspected      DVT ppx: Heparin s.c [] LMWH [] - SCD at this time. Chemoppx on hold due to IPH    ID: afebrile, leukocytosis 11.38, monitor for si/sx of infection     OTHER: condition and plan of care d/w patient, and daughter at bedside using  IPAD, questions and concerns addressed. Suggest reevaluate goals of care at this time.    DISPOSITION: Rehab or home depending on PT eval once stable and workup is complete      CORE MEASURES:        Admission NIHSS:      Tenecteplase : [] YES [x] NO      LDL/HDL/A1C: pending     Depression Screen- if depression hx and/or present -p     Statin Therapy: as noted     Dysphagia Screen: [] PASS [x] FAIL     Smoking [] YES [x] NO      Afib [] YES [x] NO     Stroke Education [] YES [] NO- ordered and pending    Obtain screening lower extremity venous ultrasound in patients who meet 1 or more of the following criteria as patient is high risk for DVT/PE on admission:   [] History of DVT/PE  []Hypercoagulable states (Factor V Leiden, Cancer, OCP, etc. )  []Prolonged immobility (hemiplegia/hemiparesis/post operative or any other extended immobilization)  [] Transferred from outside facility (Rehab or Long term care)  [] Age </= to 50 ASSESSMENT: 89 y/o Danish speaking female from [facility] with PMHx of HTN, s/p R hip surgery, s/p b/l eye surgery presented to ED brought in by verónica for increased lethargy. As per grandson, patient was in usual state of health without any acute complaints until Sunday 7/2/23 when patient became lethargic, not conversant as usual, and had an episode of urinary incontinence. Denied any episodes of incontinence in the past. Daughter at bedside stated that she fell a little while ago while back home in [facility]. 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  Etiology of hemorrhage could be traumatic vs. due to hypertension.     NEURO: Repeat head CT was done on 7/3/23 showing no change in right parietal and parenchymal hemorrhage w/ local SAH extension. Grandson at bedside states patient is improving due to the fact that she is expressing her feelings and needs. Patient currently plan for home with hospice. Further workup pending goals of care.     ANTITHROMBOTIC THERAPY: no neurological indication at this time.     PULMONARY:  protecting airway, saturating well                GASTROINTESTINAL:  dysphagia screen- pass, advance as tolerated     Diet: Puree    RENAL: BUN/Cr 24.7/1.03. monitor urine output, maintain adequate hydration as tolerated     Na Goal:  135-145    HEMATOLOGY: H/H 11.1/35.5, monitor for signs and symptoms of further anemia. Platelets 218. patient should have all age and risk appropriate malignancy screenings with PCP or sooner if clinically suspected. At this time goals of care are at home hospice.      DVT ppx: Heparin s.c [] LMWH [] - SCD at this time. Chemoppx on hold due to IPH    ID: afebrile, leukocytosis 11.38, monitor for si/sx of infection     OTHER: questions and concerns addressed. Palliative following.    DISPOSITION: Home with hospice      CORE MEASURES:        Admission NIHSS:      Tenecteplase : [] YES [x] NO      LDL/HDL/A1C: pending     Depression Screen- if depression hx and/or present -p     Statin Therapy: as noted     Dysphagia Screen: [] PASS [x] FAIL     Smoking [] YES [x] NO      Afib [] YES [x] NO     Stroke Education [] YES [] NO- ordered and pending    Obtain screening lower extremity venous ultrasound in patients who meet 1 or more of the following criteria as patient is high risk for DVT/PE on admission:   [] History of DVT/PE  []Hypercoagulable states (Factor V Leiden, Cancer, OCP, etc. )  []Prolonged immobility (hemiplegia/hemiparesis/post operative or any other extended immobilization)  [] Transferred from outside facility (Rehab or Long term care)  [] Age </= to 50

## 2023-07-06 VITALS
DIASTOLIC BLOOD PRESSURE: 54 MMHG | SYSTOLIC BLOOD PRESSURE: 119 MMHG | HEART RATE: 65 BPM | RESPIRATION RATE: 18 BRPM | TEMPERATURE: 99 F | OXYGEN SATURATION: 96 %

## 2023-07-06 LAB
ANION GAP SERPL CALC-SCNC: 13 MMOL/L — SIGNIFICANT CHANGE UP (ref 5–17)
BASOPHILS # BLD AUTO: 0.08 K/UL — SIGNIFICANT CHANGE UP (ref 0–0.2)
BASOPHILS NFR BLD AUTO: 0.8 % — SIGNIFICANT CHANGE UP (ref 0–2)
BUN SERPL-MCNC: 27.4 MG/DL — HIGH (ref 8–20)
CALCIUM SERPL-MCNC: 8.6 MG/DL — SIGNIFICANT CHANGE UP (ref 8.4–10.5)
CHLORIDE SERPL-SCNC: 105 MMOL/L — SIGNIFICANT CHANGE UP (ref 96–108)
CO2 SERPL-SCNC: 21 MMOL/L — LOW (ref 22–29)
CREAT SERPL-MCNC: 1.11 MG/DL — SIGNIFICANT CHANGE UP (ref 0.5–1.3)
EGFR: 45 ML/MIN/1.73M2 — LOW
EOSINOPHIL # BLD AUTO: 0.15 K/UL — SIGNIFICANT CHANGE UP (ref 0–0.5)
EOSINOPHIL NFR BLD AUTO: 1.6 % — SIGNIFICANT CHANGE UP (ref 0–6)
GLUCOSE SERPL-MCNC: 117 MG/DL — HIGH (ref 70–99)
HCT VFR BLD CALC: 33.3 % — LOW (ref 34.5–45)
HGB BLD-MCNC: 10.7 G/DL — LOW (ref 11.5–15.5)
IMM GRANULOCYTES NFR BLD AUTO: 1.1 % — HIGH (ref 0–0.9)
LYMPHOCYTES # BLD AUTO: 1.13 K/UL — SIGNIFICANT CHANGE UP (ref 1–3.3)
LYMPHOCYTES # BLD AUTO: 11.7 % — LOW (ref 13–44)
MCHC RBC-ENTMCNC: 31 PG — SIGNIFICANT CHANGE UP (ref 27–34)
MCHC RBC-ENTMCNC: 32.1 GM/DL — SIGNIFICANT CHANGE UP (ref 32–36)
MCV RBC AUTO: 96.5 FL — SIGNIFICANT CHANGE UP (ref 80–100)
MONOCYTES # BLD AUTO: 0.78 K/UL — SIGNIFICANT CHANGE UP (ref 0–0.9)
MONOCYTES NFR BLD AUTO: 8.1 % — SIGNIFICANT CHANGE UP (ref 2–14)
NEUTROPHILS # BLD AUTO: 7.4 K/UL — SIGNIFICANT CHANGE UP (ref 1.8–7.4)
NEUTROPHILS NFR BLD AUTO: 76.7 % — SIGNIFICANT CHANGE UP (ref 43–77)
PLATELET # BLD AUTO: 364 K/UL — SIGNIFICANT CHANGE UP (ref 150–400)
POTASSIUM SERPL-MCNC: 3.6 MMOL/L — SIGNIFICANT CHANGE UP (ref 3.5–5.3)
POTASSIUM SERPL-SCNC: 3.6 MMOL/L — SIGNIFICANT CHANGE UP (ref 3.5–5.3)
RBC # BLD: 3.45 M/UL — LOW (ref 3.8–5.2)
RBC # FLD: 14.6 % — HIGH (ref 10.3–14.5)
SODIUM SERPL-SCNC: 139 MMOL/L — SIGNIFICANT CHANGE UP (ref 135–145)
WBC # BLD: 9.65 K/UL — SIGNIFICANT CHANGE UP (ref 3.8–10.5)
WBC # FLD AUTO: 9.65 K/UL — SIGNIFICANT CHANGE UP (ref 3.8–10.5)

## 2023-07-06 PROCEDURE — 99239 HOSP IP/OBS DSCHRG MGMT >30: CPT

## 2023-07-06 RX ORDER — HYDROCHLOROTHIAZIDE 25 MG
0.5 TABLET ORAL
Refills: 0 | DISCHARGE

## 2023-07-06 RX ORDER — AMLODIPINE BESYLATE 2.5 MG/1
1 TABLET ORAL
Refills: 0 | DISCHARGE

## 2023-07-06 RX ORDER — SENNA PLUS 8.6 MG/1
2 TABLET ORAL
Qty: 0 | Refills: 0 | DISCHARGE
Start: 2023-07-06

## 2023-07-06 RX ORDER — LEVETIRACETAM 250 MG/1
1 TABLET, FILM COATED ORAL
Qty: 60 | Refills: 0
Start: 2023-07-06 | End: 2023-08-04

## 2023-07-06 RX ORDER — DILTIAZEM HCL 120 MG
1 CAPSULE, EXT RELEASE 24 HR ORAL
Qty: 30 | Refills: 0
Start: 2023-07-06 | End: 2023-08-04

## 2023-07-06 RX ADMIN — Medication 60 MILLIGRAM(S): at 05:28

## 2023-07-06 RX ADMIN — Medication 60 MILLIGRAM(S): at 14:54

## 2023-07-06 RX ADMIN — LEVETIRACETAM 400 MILLIGRAM(S): 250 TABLET, FILM COATED ORAL at 14:54

## 2023-07-06 RX ADMIN — MUPIROCIN 1 APPLICATION(S): 20 OINTMENT TOPICAL at 05:27

## 2023-07-06 RX ADMIN — AMPICILLIN SODIUM AND SULBACTAM SODIUM 200 GRAM(S): 250; 125 INJECTION, POWDER, FOR SUSPENSION INTRAMUSCULAR; INTRAVENOUS at 05:28

## 2023-07-06 RX ADMIN — Medication 1 TABLET(S): at 14:54

## 2023-07-06 RX ADMIN — CHLORHEXIDINE GLUCONATE 1 APPLICATION(S): 213 SOLUTION TOPICAL at 05:28

## 2023-07-06 NOTE — DISCHARGE NOTE PROVIDER - NSDCMRMEDTOKEN_GEN_ALL_CORE_FT
amoxicillin-clavulanate 875 mg-125 mg oral tablet: 1 tab(s) orally 2 times a day  Cardizem  mg/24 hours oral capsule, extended release: 1 cap(s) orally once a day  Keppra 250 mg oral tablet: 1 tab(s) orally 2 times a day  Multiple Vitamins oral tablet: 1 tab(s) orally once a day  senna leaf extract oral tablet: 2 tab(s) orally once a day (at bedtime)

## 2023-07-06 NOTE — DISCHARGE NOTE PROVIDER - PROVIDER TOKENS
FREE:[LAST:[Hospice MD],PHONE:[(   )    -],FAX:[(   )    -]],PROVIDER:[TOKEN:[[id number]]],PROVIDER:[TOKEN:[[id number]]]

## 2023-07-06 NOTE — DISCHARGE NOTE PROVIDER - HOSPITAL COURSE
This is a 89 year old female PMHx HTN brought in by family for increase in lethargy and an episode of urinary incontinence.  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 Neurosurgery consulted, recommended no acute surgical intervention .  Admitted for ICH. Palliative care consulted for [facility].  Confirmed she is DNR/DNI. Family hopeful can return to [facility] to live out her days but they understand that may not happen and want to prepare with having home hospice in place . Family available at home to care for patient in the off hours hospice is not present. Will  complete 5 day course of ABX for leukocytosis, empiric coverage.  The patient is medically stable for dc home with home hospice services.

## 2023-07-06 NOTE — DISCHARGE NOTE PROVIDER - NSDCCPCAREPLAN_GEN_ALL_CORE_FT
PRINCIPAL DISCHARGE DIAGNOSIS  Diagnosis: Nontraumatic intracerebral hemorrhage  Assessment and Plan of Treatment: No surgical intervention indicated  Continue Keppra for seizure ppx  Hospice support services      SECONDARY DISCHARGE DIAGNOSES  Diagnosis: Cardiac arrhythmia  Assessment and Plan of Treatment: Stop amlodipine  Continue Cardizem as prescribed    Diagnosis: Leukocytosis  Assessment and Plan of Treatment: Complete course of ABX as prescribed.

## 2023-07-06 NOTE — DISCHARGE NOTE PROVIDER - ATTENDING DISCHARGE PHYSICAL EXAMINATION:
South African translated by bedside RN   Constitutional: NAD, Resting  ENT: Supple, No JVD  Lungs: CTA B/L, Non-labored breathing  Cardio: RRR, S1/S2, No murmur  Abdomen: Soft, Nontender, Nondistended; Bowel sounds present  Extremities: No calf tenderness, No pitting edema  Musculoskeletal:   No joint swelling  Psych: Calm, cooperative affect appropriate  Neuro: Awake and alert, oriented to name, moves extremities  Skin: No rashes; no palpable lesions

## 2023-07-06 NOTE — DISCHARGE NOTE PROVIDER - CARE PROVIDER_API CALL
Rahul LISA,   Phone: (   )    -  Fax: (   )    -  Follow Up Time:     Kika Corrales  Cardiology  [address] [place] [address]  Phone: [phone]  Fax: [phone]  Follow Up Time:     Carlos Oneill  [facility]  [address], [address]  [place] [address]  Phone: [phone]  Fax: [phone]  Follow Up Time:

## 2023-07-07 NOTE — CONSULT NOTE ADULT - CONSULT REQUESTED DATE/TIME
Discussed options including vag estrogen vs vag moisterizers, dilators.  Desires vag estrogen pill.  Prescribed.    
04-Jul-2023 14:00
05-Jul-2023 11:53
03-Jul-2023 11:21
05-Jul-2023

## 2023-07-08 LAB
CULTURE RESULTS: SIGNIFICANT CHANGE UP
CULTURE RESULTS: SIGNIFICANT CHANGE UP
SPECIMEN SOURCE: SIGNIFICANT CHANGE UP
SPECIMEN SOURCE: SIGNIFICANT CHANGE UP

## 2023-07-20 PROCEDURE — 93005 ELECTROCARDIOGRAM TRACING: CPT

## 2023-07-20 PROCEDURE — 73502 X-RAY EXAM HIP UNI 2-3 VIEWS: CPT

## 2023-07-20 PROCEDURE — 80061 LIPID PANEL: CPT

## 2023-07-20 PROCEDURE — 87641 MR-STAPH DNA AMP PROBE: CPT

## 2023-07-20 PROCEDURE — 83036 HEMOGLOBIN GLYCOSYLATED A1C: CPT

## 2023-07-20 PROCEDURE — 85027 COMPLETE CBC AUTOMATED: CPT

## 2023-07-20 PROCEDURE — 93306 TTE W/DOPPLER COMPLETE: CPT

## 2023-07-20 PROCEDURE — 36415 COLL VENOUS BLD VENIPUNCTURE: CPT

## 2023-07-20 PROCEDURE — 72125 CT NECK SPINE W/O DYE: CPT | Mod: MA

## 2023-07-20 PROCEDURE — 80048 BASIC METABOLIC PNL TOTAL CA: CPT

## 2023-07-20 PROCEDURE — 81001 URINALYSIS AUTO W/SCOPE: CPT

## 2023-07-20 PROCEDURE — 92610 EVALUATE SWALLOWING FUNCTION: CPT

## 2023-07-20 PROCEDURE — 99285 EMERGENCY DEPT VISIT HI MDM: CPT | Mod: 25

## 2023-07-20 PROCEDURE — 87040 BLOOD CULTURE FOR BACTERIA: CPT

## 2023-07-20 PROCEDURE — 84100 ASSAY OF PHOSPHORUS: CPT

## 2023-07-20 PROCEDURE — 83735 ASSAY OF MAGNESIUM: CPT

## 2023-07-20 PROCEDURE — 71045 X-RAY EXAM CHEST 1 VIEW: CPT

## 2023-07-20 PROCEDURE — 87086 URINE CULTURE/COLONY COUNT: CPT

## 2023-07-20 PROCEDURE — 84484 ASSAY OF TROPONIN QUANT: CPT

## 2023-07-20 PROCEDURE — T1013: CPT

## 2023-07-20 PROCEDURE — G1004: CPT

## 2023-07-20 PROCEDURE — 70450 CT HEAD/BRAIN W/O DYE: CPT | Mod: ME

## 2023-07-20 PROCEDURE — 74177 CT ABD & PELVIS W/CONTRAST: CPT | Mod: MA

## 2023-07-20 PROCEDURE — 83690 ASSAY OF LIPASE: CPT

## 2023-07-20 PROCEDURE — 85025 COMPLETE CBC W/AUTO DIFF WBC: CPT

## 2023-07-20 PROCEDURE — 97167 OT EVAL HIGH COMPLEX 60 MIN: CPT

## 2023-07-20 PROCEDURE — 87640 STAPH A DNA AMP PROBE: CPT

## 2023-07-20 PROCEDURE — 83605 ASSAY OF LACTIC ACID: CPT

## 2023-07-20 PROCEDURE — 80053 COMPREHEN METABOLIC PANEL: CPT

## 2023-07-20 PROCEDURE — 84443 ASSAY THYROID STIM HORMONE: CPT

## 2023-07-20 PROCEDURE — 82962 GLUCOSE BLOOD TEST: CPT

## 2024-06-04 NOTE — ED PROVIDER NOTE - PHYSICAL EXAMINATION
Patient had colonoscopy with polypectomy x1  Patient tolerated procedure under MAC  
Gen: Well appearing in NAD  Head: NC/AT  Neck: trachea midline  Card: regular rate and rhythm  Resp:  CTAB  Abd: mildly distended, diffusely tender, worst suprapubically.  Ext: no deformities above reported baseline  Neuro:  A&O, no motor or sensory deficits above reported baseline  Skin:  Warm and dry as visualized  Psych:  Normal affect and mood

## 2024-07-31 NOTE — CONSULT NOTE ADULT - REASON FOR ADMISSION
Subjective:   Patient ID: Richy Schmid is a 20 year old female who presents for evaluation of breakthrough bleeding with IUD.  Pt has had IUD x 1 year without issues and last month has been bleeding daily.  Denies any pain  Was recently tested for GC./Chl neg. Recently had yeast infection that was treated    HPI    History/Other:   Review of Systems   Constitutional: Negative.    Respiratory: Negative.     Cardiovascular: Negative.    Psychiatric/Behavioral: Negative.       Current Outpatient Medications   Medication Sig Dispense Refill    Levonorgestrel (MIRENA, 52 MG,) 20 MCG/DAY Intrauterine IUD       Tretinoin 0.05 % External Cream Apply to face nightly as tolerated 45 g 1     Allergies:  Allergies   Allergen Reactions    Penicillin V      Other reaction(s): upset stomach       Objective:   Physical Exam  Vitals reviewed.   Constitutional:       General: She is not in acute distress.     Appearance: Normal appearance. She is normal weight. She is not ill-appearing, toxic-appearing or diaphoretic.   Genitourinary:     General: Normal vulva.      Labia:         Right: No rash, tenderness, lesion or injury.         Left: No rash, tenderness, lesion or injury.       Vagina: Bleeding (small amount red) present. No vaginal discharge, tenderness, lesions or prolapsed vaginal walls.      Cervix: Cervical bleeding (from os) present. No cervical motion tenderness, discharge, erythema or eversion.      Uterus: Normal.       Adnexa: Right adnexa normal and left adnexa normal.      Comments: IUD strings visualized  Neurological:      Mental Status: She is alert.         Assessment & Plan:   1. Breakthrough bleeding with IUD    2. Vaginitis and vulvovaginitis    IUD appears to be in place no pain or abnormality on exam  IUD strings visualized.   Reviewed with patient possible causes of breakthrough bleeding with IUD  Repeat vaginal culture - if pos treat if neg further testing  Pelvic ultrasound for IUD placement if  cultures negative  Warning signs reviewed   All questions answered  Total time spent 20 minutes this calendar day which includes preparing to see the patient including chart review, obtaining and/or reviewing additional medical history, performing a physical exam and evaluation, documenting clinical information in the electronic medical record, independently interpreting results, counseling the patient, communicating results to the patient/family/caregiver and coordinating care.        Orders Placed This Encounter   Procedures    Vaginitis Vaginosis PCR Panel       Meds This Visit:  Requested Prescriptions      No prescriptions requested or ordered in this encounter       Imaging & Referrals:  None     ICH